# Patient Record
Sex: MALE | Race: WHITE | NOT HISPANIC OR LATINO | Employment: FULL TIME | URBAN - NONMETROPOLITAN AREA
[De-identification: names, ages, dates, MRNs, and addresses within clinical notes are randomized per-mention and may not be internally consistent; named-entity substitution may affect disease eponyms.]

---

## 2023-10-19 RX ORDER — CIPROFLOXACIN 500 MG/1
TABLET ORAL
COMMUNITY
End: 2023-11-17 | Stop reason: ALTCHOICE

## 2023-10-19 RX ORDER — FLURBIPROFEN 100 MG/1
100 TABLET, FILM COATED ORAL 2 TIMES DAILY
COMMUNITY
End: 2023-11-17 | Stop reason: ALTCHOICE

## 2023-10-19 NOTE — PROGRESS NOTES
Patient presents to the office today for ongoing right testicle pain...  Patient has a Hx of Right Testicle Pain intermittently on the left. Patient states the pain is a dull ache 2/10 radiating to right leg...Right testicle is swollen...Recent Scrotal US 12/8/22) was unremarkable.  LUTs are chronic and mild. Denies frequency and urgency. Denies dysuria and hematuria.. Nocturia x1.. Caffeine does worsen LUTs.. No medications for LUTs.

## 2023-10-23 ENCOUNTER — OFFICE VISIT (OUTPATIENT)
Dept: UROLOGY | Facility: CLINIC | Age: 28
End: 2023-10-23
Payer: COMMERCIAL

## 2023-10-23 VITALS
BODY MASS INDEX: 25.62 KG/M2 | HEART RATE: 88 BPM | HEIGHT: 71 IN | DIASTOLIC BLOOD PRESSURE: 91 MMHG | WEIGHT: 183 LBS | SYSTOLIC BLOOD PRESSURE: 152 MMHG

## 2023-10-23 DIAGNOSIS — N50.82 SCROTAL PAIN: Primary | ICD-10-CM

## 2023-10-23 PROCEDURE — 99214 OFFICE O/P EST MOD 30 MIN: CPT | Performed by: STUDENT IN AN ORGANIZED HEALTH CARE EDUCATION/TRAINING PROGRAM

## 2023-10-23 RX ORDER — AMOXICILLIN AND CLAVULANATE POTASSIUM 875; 125 MG/1; MG/1
875 TABLET, FILM COATED ORAL 2 TIMES DAILY
Qty: 8 TABLET | Refills: 0 | Status: SHIPPED | OUTPATIENT
Start: 2023-10-23 | End: 2023-11-17 | Stop reason: ALTCHOICE

## 2023-10-23 NOTE — PROGRESS NOTES
Subjective   Patient ID: Christopher Garcia is a 28 y.o. male who presents for No chief complaint on file.  HPI  Patient presents to the office today for intermittent right testicular tenderness. Patient has a history of Right Testicle Pain intermittently on the left. Patient states the pain is a dull ache 3/10 radiating to right leg. Right testicle is swollen. Patient was having right flank pain but pain has gone away. No n/v, no f/c. Recent Scrotal US (12/8/22) was unremarkable. Patient was prescribed with flurbiprofen and Cipro which did help symptoms for a couple days but pain did return. LUTs are chronic and mild. Denies frequency and urgency. Denies dysuria and hematuria.. Nocturia x1. Caffeine does worsen LUTs. No medications for LUTs. His right testicular pain is currently constant and dull. He denies any recent unprotected sexual encounter with a new partner. 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     Review of Systems  : Positive right testicular pain (dull)  All systems were reviewed. Anything negative was noted in the HPI.    Objective   Physical Exam  Constitutional:       General: He is not in acute distress.     Appearance: He is well-developed.   HENT:      Right Ear: External ear normal.      Mouth/Throat:      Pharynx: Oropharynx is clear.   Eyes:      General: No scleral icterus.     Conjunctiva/sclera: Conjunctivae normal.      Right eye: Right conjunctiva is not injected.      Left eye: Left conjunctiva is not injected.   Pulmonary:      Effort: Pulmonary effort is normal. No respiratory distress.      Breath sounds: Normal breath sounds.   Abdominal:      General: Abdomen is flat. Bowel sounds are normal.      Palpations: Abdomen is soft.      Hernia: There is no hernia in the left inguinal area or right inguinal area.   Genitourinary:     Penis: Normal.       Testes:         Right: Tenderness present.         Left: Mass, tenderness or swelling not present.       Prostate: Not tender.   Musculoskeletal:      Right lower leg: No edema.      Left lower leg: No edema.   Neurological:      Mental Status: He is alert and oriented to person, place, and time.      Motor: Motor function is intact.      Gait: Gait is intact.   Psychiatric:         Attention and Perception: Attention normal. He does not perceive visual hallucinations.         Mood and Affect: Affect normal.         Speech: Speech normal.         Behavior: Behavior normal. Behavior is cooperative.         Judgment: Judgment normal.       No past medical history on file.    No past surgical history on file.    Assessment/Plan   Diagnoses and all orders for this visit:  Scrotal pain      - Right testicular pain: He will take OTC Advil or ibuprofen PRN. He has failed empiric treatment with flurbiprofen 100mg twice daily and ciprofloxacin 500mg p.o. twice daily for 4 days each. We discussed the risk, benefits, adverse events, side effects of the medications, he verbalized understanding and would like to proceed. We also discussed lifestyle modifications in the form of scrotal elevation, ice packing, and frequent ejaculation.    Plan:  Lifestyle modifications  Repeat scrotal US  Start antibiotic course    Follow up: Pending imaging    Scribed for Dr. Horace Kline by Maribel Alvarez medical scribe, on 10/23/23 at 9:00 AM

## 2023-11-17 ENCOUNTER — OFFICE VISIT (OUTPATIENT)
Dept: PRIMARY CARE | Facility: CLINIC | Age: 28
End: 2023-11-17
Payer: COMMERCIAL

## 2023-11-17 VITALS
DIASTOLIC BLOOD PRESSURE: 80 MMHG | WEIGHT: 177 LBS | HEIGHT: 70 IN | HEART RATE: 80 BPM | SYSTOLIC BLOOD PRESSURE: 124 MMHG | BODY MASS INDEX: 25.34 KG/M2

## 2023-11-17 DIAGNOSIS — F17.200 SMOKER: ICD-10-CM

## 2023-11-17 DIAGNOSIS — G43.911 INTRACTABLE MIGRAINE WITH STATUS MIGRAINOSUS, UNSPECIFIED MIGRAINE TYPE: Primary | ICD-10-CM

## 2023-11-17 DIAGNOSIS — H57.11 PAIN OF RIGHT EYE: ICD-10-CM

## 2023-11-17 PROCEDURE — 99214 OFFICE O/P EST MOD 30 MIN: CPT

## 2023-11-17 RX ORDER — IBUPROFEN 400 MG/1
400 TABLET ORAL 2 TIMES DAILY
COMMUNITY

## 2023-11-17 RX ORDER — SUMATRIPTAN 50 MG/1
50 TABLET, FILM COATED ORAL ONCE AS NEEDED
Qty: 27 TABLET | Refills: 3 | Status: SHIPPED | OUTPATIENT
Start: 2023-11-17 | End: 2024-11-16

## 2023-11-17 ASSESSMENT — ENCOUNTER SYMPTOMS
CARDIOVASCULAR NEGATIVE: 1
CONSTITUTIONAL NEGATIVE: 1
GASTROINTESTINAL NEGATIVE: 1
RESPIRATORY NEGATIVE: 1

## 2023-11-17 ASSESSMENT — PATIENT HEALTH QUESTIONNAIRE - PHQ9
2. FEELING DOWN, DEPRESSED OR HOPELESS: NOT AT ALL
SUM OF ALL RESPONSES TO PHQ9 QUESTIONS 1 AND 2: 0
1. LITTLE INTEREST OR PLEASURE IN DOING THINGS: NOT AT ALL

## 2023-11-17 NOTE — PROGRESS NOTES
"Subjective   Patient ID: Christopher Garcia is a 28 y.o. male who presents for new pt , c/o  migraine last week with vision issues  (Vision issues lasted 5 days , pt c/o feeling like something is in his right eye).    HPI   MIGRAINES: Migraine 7 days ago. Symptoms were pain to entire forehead, photophobia, nausea, difficulty to focus visually. Endorses had similar episode about 8 years ago. Tried ibuprofen/Tylenol with only very slight relief. Migraine lasted for 5 days and progressively got better each day. He does not hydrate. Does not take any vitamins.  SMOKER: 15 pack years. Would like to quit, will attempt to do this without medication for now.    Endorses hx of anxiety, self manages this without medication.    Endorses woke up early this morning and feels like something is in his R eyelid.    Fam hx of colon cancer.    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    Review of Systems   Constitutional: Negative.    Respiratory: Negative.     Cardiovascular: Negative.    Gastrointestinal: Negative.        Objective   /80   Pulse 80   Ht 1.778 m (5' 10\")   Wt 80.3 kg (177 lb)   BMI 25.40 kg/m²     Physical Exam  Constitutional:       General: He is not in acute distress.     Appearance: Normal appearance. He is not ill-appearing.   HENT:      Head: Normocephalic and atraumatic.   Eyes:      Extraocular Movements: Extraocular movements intact.      Conjunctiva/sclera: Conjunctivae normal.   Cardiovascular:      Rate and Rhythm: Normal rate.   Pulmonary:      Effort: Pulmonary effort is normal.   Abdominal:      General: There is no distension.   Musculoskeletal:         General: Normal range of motion.      Cervical back: Normal range of motion.   Skin:     General: Skin is warm and dry.   Neurological:      General: No focal deficit present.      Mental Status: He is alert and oriented to person, place, and time.   Psychiatric:         Mood and Affect: Mood normal.         Behavior: Behavior normal.   "       Thought Content: Thought content normal.         Judgment: Judgment normal.         Assessment/Plan        Rx Imitrex prn migraines.  Offered medication for smoking cessation, he will let me know.  Examined eye and no foreign bodies seen, advised keep moist and let me know if not resolving.  Follow up 6 months or sooner prn.

## 2023-11-20 DIAGNOSIS — N50.812 PAIN IN BOTH TESTICLES: Primary | ICD-10-CM

## 2023-11-20 DIAGNOSIS — N50.811 PAIN IN BOTH TESTICLES: Primary | ICD-10-CM

## 2023-11-21 NOTE — PROGRESS NOTES
Patient presents to the office today for a 1 MO F/U w/Scrotal US(11/22/2023)... Results show   ... Patient has a Hx of Right Testicle Pain intermittently on the left. Patient states the pain is a dull ache 2/10 radiating to right leg...Right testicle is swollen...Recent Scrotal US 12/8/22) was unremarkable.  LUTs are chronic and mild. Denies frequency and urgency. Denies dysuria and hematuria.. Nocturia x1.. Caffeine does worsen LUTs.. No medications for LUTs

## 2023-11-22 ENCOUNTER — HOSPITAL ENCOUNTER (OUTPATIENT)
Dept: RADIOLOGY | Facility: HOSPITAL | Age: 28
Discharge: HOME | End: 2023-11-22
Payer: COMMERCIAL

## 2023-11-22 DIAGNOSIS — N50.811 PAIN IN BOTH TESTICLES: ICD-10-CM

## 2023-11-22 DIAGNOSIS — N50.812 PAIN IN BOTH TESTICLES: ICD-10-CM

## 2023-11-22 PROCEDURE — 76870 US EXAM SCROTUM: CPT | Performed by: RADIOLOGY

## 2023-11-22 PROCEDURE — 93975 VASCULAR STUDY: CPT

## 2023-11-27 ENCOUNTER — OFFICE VISIT (OUTPATIENT)
Dept: UROLOGY | Facility: CLINIC | Age: 28
End: 2023-11-27
Payer: COMMERCIAL

## 2023-11-27 VITALS
DIASTOLIC BLOOD PRESSURE: 82 MMHG | HEART RATE: 99 BPM | BODY MASS INDEX: 26.77 KG/M2 | HEIGHT: 70 IN | WEIGHT: 187 LBS | SYSTOLIC BLOOD PRESSURE: 148 MMHG

## 2023-11-27 DIAGNOSIS — N50.82 SCROTAL PAIN: Primary | ICD-10-CM

## 2023-11-27 PROCEDURE — 99213 OFFICE O/P EST LOW 20 MIN: CPT | Performed by: STUDENT IN AN ORGANIZED HEALTH CARE EDUCATION/TRAINING PROGRAM

## 2023-11-27 NOTE — PROGRESS NOTES
Subjective   Patient ID: Christopher Gacria is a 28 y.o. male who presents for Testicle Pain (1 MO F/U Scrotal US).  Testicle Pain  The patient's primary symptoms include testicular pain.   Patient presents to the office today for a 1 MO F/U w/Scrotal US(11/22/2023)... Results show trace bilateral hydroceles. Otherwise unremarkable bilateral  scrotal/testicular ultrasound. Patient has a Hx of Right Testicle Pain intermittently on the left. Patient states the pain is a dull ache 2/10 radiating to right leg and originally started 1 year ago. He did not tolerate Flurbiprofen in the past, He has been taking Advil PRN to manage the pain. Right testicle is swollen...Recent Scrotal US 12/8/22) was unremarkable.  LUTs are chronic and mild. Denies frequency and urgency. Denies dysuria and hematuria.. Nocturia x1.. Caffeine does worsen LUTs.. No medications for LUTs     Review of Systems   Genitourinary:  Positive for testicular pain.   All systems were reviewed. Anything negative was noted in the HPI.    Objective   Physical Exam  Constitutional:       General: He is not in acute distress.     Appearance: He is well-developed.   HENT:      Right Ear: External ear normal.      Mouth/Throat:      Pharynx: Oropharynx is clear.   Eyes:      General: No scleral icterus.     Conjunctiva/sclera: Conjunctivae normal.      Right eye: Right conjunctiva is not injected.      Left eye: Left conjunctiva is not injected.   Pulmonary:      Effort: Pulmonary effort is normal. No respiratory distress.      Breath sounds: Normal breath sounds.   Abdominal:      General: Abdomen is flat. Bowel sounds are normal.      Palpations: Abdomen is soft.   Musculoskeletal:      Right lower leg: No edema.      Left lower leg: No edema.   Neurological:      Mental Status: He is alert and oriented to person, place, and time.      Motor: Motor function is intact.      Gait: Gait is intact.   Psychiatric:         Attention and Perception: Attention normal.  He does not perceive visual hallucinations.         Mood and Affect: Affect normal.         Speech: Speech normal.         Behavior: Behavior normal. Behavior is cooperative.         Judgment: Judgment normal.       No past medical history on file.    No past surgical history on file.    Assessment/Plan   There are no diagnoses linked to this encounter.     - Groin pain: Scrotal US(11/22/2023)... Results show trace bilateral hydroceles. Otherwise unremarkable bilateral  scrotal/testicular ultrasound. We had a very long and extensive discussion with the patient regarding the pathophysiology, differential diagnosis, risk factor, management, natural history, incidence and diagnostic work-up of the condition. We also discussed lifestyle modifications in the form of scrotal elevation, ice packing, and frequent ejaculation.    Plan:  Lifestyle modifications  OTC anti inflammatory PRN  Referral for pelvic floor physical therapy    Follow up: 6 months    Scribed for Dr. Horace Kline by Maribel Alvarez medical scribe, on 11/27/23 at 11:00 AM

## 2023-12-14 ENCOUNTER — OFFICE VISIT (OUTPATIENT)
Dept: PRIMARY CARE | Facility: CLINIC | Age: 28
End: 2023-12-14
Payer: COMMERCIAL

## 2023-12-14 VITALS
HEART RATE: 104 BPM | BODY MASS INDEX: 25.77 KG/M2 | SYSTOLIC BLOOD PRESSURE: 128 MMHG | HEIGHT: 70 IN | DIASTOLIC BLOOD PRESSURE: 72 MMHG | WEIGHT: 180 LBS

## 2023-12-14 DIAGNOSIS — R51.9 NEW ONSET HEADACHE: ICD-10-CM

## 2023-12-14 DIAGNOSIS — G43.911 INTRACTABLE MIGRAINE WITH STATUS MIGRAINOSUS, UNSPECIFIED MIGRAINE TYPE: Primary | ICD-10-CM

## 2023-12-14 PROCEDURE — 99213 OFFICE O/P EST LOW 20 MIN: CPT

## 2023-12-14 ASSESSMENT — ENCOUNTER SYMPTOMS
HEADACHES: 1
CARDIOVASCULAR NEGATIVE: 1
RESPIRATORY NEGATIVE: 1
GASTROINTESTINAL NEGATIVE: 1
CONSTITUTIONAL NEGATIVE: 1

## 2023-12-14 NOTE — PROGRESS NOTES
"Subjective   Patient ID: Christopher Garcia is a 28 y.o. male who presents for pt c/o having issues with his eyes  (He is having light sensitivity, tired feeling eyes and heaviness ).    HPI   Hx of migraines, endorses eye \"tiredness\" and photosensitivity intermittently most days, these symptoms have started since first migraine. He does feel his eye \"tiredness\" and photosensitivity are related to his new migraines. He is hydrating well. Has not tried Imitrex yet, he is taking ibuprofen prn. Has seen eye doctor and unremarkable exam.     Review of Systems   Constitutional: Negative.    Respiratory: Negative.     Cardiovascular: Negative.    Gastrointestinal: Negative.    Neurological:  Positive for headaches.       Objective   /72   Pulse 104   Ht 1.778 m (5' 10\")   Wt 81.6 kg (180 lb)   BMI 25.83 kg/m²     Physical Exam  Constitutional:       General: He is not in acute distress.     Appearance: Normal appearance. He is not ill-appearing.   HENT:      Head: Normocephalic and atraumatic.   Eyes:      Extraocular Movements: Extraocular movements intact.      Conjunctiva/sclera: Conjunctivae normal.   Cardiovascular:      Rate and Rhythm: Normal rate.   Pulmonary:      Effort: Pulmonary effort is normal.   Abdominal:      General: There is no distension.   Musculoskeletal:         General: Normal range of motion.      Cervical back: Normal range of motion.   Skin:     General: Skin is warm and dry.   Neurological:      General: No focal deficit present.      Mental Status: He is alert and oriented to person, place, and time.   Psychiatric:         Mood and Affect: Mood normal.         Behavior: Behavior normal.         Thought Content: Thought content normal.         Judgment: Judgment normal.         Assessment/Plan        Since new symptoms from migraine not resolving will order MRI brain.   We have follow up scheduled for next spring.  "

## 2024-04-02 ENCOUNTER — OFFICE VISIT (OUTPATIENT)
Dept: PRIMARY CARE | Facility: CLINIC | Age: 29
End: 2024-04-02
Payer: MEDICAID

## 2024-04-02 ENCOUNTER — LAB (OUTPATIENT)
Dept: LAB | Facility: LAB | Age: 29
End: 2024-04-02
Payer: MEDICAID

## 2024-04-02 VITALS
SYSTOLIC BLOOD PRESSURE: 110 MMHG | HEART RATE: 77 BPM | DIASTOLIC BLOOD PRESSURE: 78 MMHG | WEIGHT: 188 LBS | BODY MASS INDEX: 26.92 KG/M2 | HEIGHT: 70 IN | OXYGEN SATURATION: 99 %

## 2024-04-02 DIAGNOSIS — F41.9 ANXIETY: ICD-10-CM

## 2024-04-02 DIAGNOSIS — Z13.29 SCREENING FOR THYROID DISORDER: ICD-10-CM

## 2024-04-02 DIAGNOSIS — E66.3 OVERWEIGHT WITH BODY MASS INDEX (BMI) OF 26 TO 26.9 IN ADULT: Primary | ICD-10-CM

## 2024-04-02 DIAGNOSIS — E66.3 OVERWEIGHT WITH BODY MASS INDEX (BMI) OF 26 TO 26.9 IN ADULT: ICD-10-CM

## 2024-04-02 LAB
ALBUMIN SERPL BCP-MCNC: 4.7 G/DL (ref 3.4–5)
ALP SERPL-CCNC: 76 U/L (ref 33–120)
ALT SERPL W P-5'-P-CCNC: 62 U/L (ref 10–52)
ANION GAP SERPL CALC-SCNC: 11 MMOL/L (ref 10–20)
AST SERPL W P-5'-P-CCNC: 37 U/L (ref 9–39)
BILIRUB SERPL-MCNC: 0.6 MG/DL (ref 0–1.2)
BUN SERPL-MCNC: 17 MG/DL (ref 6–23)
CALCIUM SERPL-MCNC: 9.9 MG/DL (ref 8.6–10.3)
CHLORIDE SERPL-SCNC: 104 MMOL/L (ref 98–107)
CHOLEST SERPL-MCNC: 179 MG/DL (ref 0–199)
CHOLESTEROL/HDL RATIO: 6
CO2 SERPL-SCNC: 27 MMOL/L (ref 21–32)
CREAT SERPL-MCNC: 0.93 MG/DL (ref 0.5–1.3)
EGFRCR SERPLBLD CKD-EPI 2021: >90 ML/MIN/1.73M*2
ERYTHROCYTE [DISTWIDTH] IN BLOOD BY AUTOMATED COUNT: 11.9 % (ref 11.5–14.5)
GLUCOSE SERPL-MCNC: 85 MG/DL (ref 74–99)
HCT VFR BLD AUTO: 50.2 % (ref 41–52)
HDLC SERPL-MCNC: 30 MG/DL
HGB BLD-MCNC: 17.3 G/DL (ref 13.5–17.5)
LDLC SERPL CALC-MCNC: 111 MG/DL
MCH RBC QN AUTO: 31.3 PG (ref 26–34)
MCHC RBC AUTO-ENTMCNC: 34.5 G/DL (ref 32–36)
MCV RBC AUTO: 91 FL (ref 80–100)
NON HDL CHOLESTEROL: 149 MG/DL (ref 0–149)
NRBC BLD-RTO: 0 /100 WBCS (ref 0–0)
PLATELET # BLD AUTO: 270 X10*3/UL (ref 150–450)
POTASSIUM SERPL-SCNC: 4.1 MMOL/L (ref 3.5–5.3)
PROT SERPL-MCNC: 7.8 G/DL (ref 6.4–8.2)
RBC # BLD AUTO: 5.52 X10*6/UL (ref 4.5–5.9)
SODIUM SERPL-SCNC: 138 MMOL/L (ref 136–145)
TRIGL SERPL-MCNC: 190 MG/DL (ref 0–149)
TSH SERPL-ACNC: 0.83 MIU/L (ref 0.44–3.98)
VLDL: 38 MG/DL (ref 0–40)
WBC # BLD AUTO: 6.9 X10*3/UL (ref 4.4–11.3)

## 2024-04-02 PROCEDURE — 80053 COMPREHEN METABOLIC PANEL: CPT

## 2024-04-02 PROCEDURE — 3008F BODY MASS INDEX DOCD: CPT

## 2024-04-02 PROCEDURE — 84443 ASSAY THYROID STIM HORMONE: CPT

## 2024-04-02 PROCEDURE — 36415 COLL VENOUS BLD VENIPUNCTURE: CPT

## 2024-04-02 PROCEDURE — 80061 LIPID PANEL: CPT

## 2024-04-02 PROCEDURE — 85027 COMPLETE CBC AUTOMATED: CPT

## 2024-04-02 PROCEDURE — 99213 OFFICE O/P EST LOW 20 MIN: CPT

## 2024-04-02 RX ORDER — BUSPIRONE HYDROCHLORIDE 5 MG/1
5 TABLET ORAL 2 TIMES DAILY PRN
Qty: 60 TABLET | Refills: 1 | Status: SHIPPED | OUTPATIENT
Start: 2024-04-02 | End: 2025-04-02

## 2024-04-02 ASSESSMENT — PATIENT HEALTH QUESTIONNAIRE - PHQ9
SUM OF ALL RESPONSES TO PHQ9 QUESTIONS 1 AND 2: 0
2. FEELING DOWN, DEPRESSED OR HOPELESS: NOT AT ALL
1. LITTLE INTEREST OR PLEASURE IN DOING THINGS: NOT AT ALL

## 2024-04-02 ASSESSMENT — ENCOUNTER SYMPTOMS
GASTROINTESTINAL NEGATIVE: 1
RESPIRATORY NEGATIVE: 1
CONSTITUTIONAL NEGATIVE: 1
CARDIOVASCULAR NEGATIVE: 1

## 2024-04-02 NOTE — PROGRESS NOTES
"Subjective   Patient ID: Christopher Garcia is a 28 y.o. male who presents for pt c/o having some heart rate issues especially when resting.    HPI   Endorses chronic anxiety, difficulty dealing with stressful situations. For the past 6-7 months has experienced feeling his heart beat when he lays in bed, not every night, but most nights. Denies CP/SOB/palpitations, no abnormal rate, blood pressure seems fine.     Review of Systems   Constitutional: Negative.    Respiratory: Negative.     Cardiovascular: Negative.    Gastrointestinal: Negative.        Objective   /78   Pulse 77   Ht 1.778 m (5' 10\")   Wt 85.3 kg (188 lb)   SpO2 99%   BMI 26.98 kg/m²     Physical Exam  Constitutional:       General: He is not in acute distress.     Appearance: Normal appearance. He is not ill-appearing.   HENT:      Head: Normocephalic and atraumatic.   Eyes:      Extraocular Movements: Extraocular movements intact.      Conjunctiva/sclera: Conjunctivae normal.   Cardiovascular:      Rate and Rhythm: Normal rate and regular rhythm.      Heart sounds: Normal heart sounds. No murmur heard.  Pulmonary:      Effort: Pulmonary effort is normal.   Abdominal:      General: There is no distension.   Musculoskeletal:         General: Normal range of motion.      Cervical back: Normal range of motion.   Skin:     General: Skin is warm and dry.   Neurological:      General: No focal deficit present.      Mental Status: He is alert and oriented to person, place, and time.   Psychiatric:         Mood and Affect: Mood normal.         Behavior: Behavior normal.         Thought Content: Thought content normal.         Judgment: Judgment normal.         Assessment/Plan        Fasting labs today.  Rx Buspar 5mg BID prn.  Follow up 2 months or sooner prn.  "

## 2024-05-20 ENCOUNTER — APPOINTMENT (OUTPATIENT)
Dept: PRIMARY CARE | Facility: CLINIC | Age: 29
End: 2024-05-20
Payer: MEDICAID

## 2024-05-20 ENCOUNTER — APPOINTMENT (OUTPATIENT)
Dept: UROLOGY | Facility: CLINIC | Age: 29
End: 2024-05-20
Payer: MEDICAID

## 2024-07-15 ENCOUNTER — APPOINTMENT (OUTPATIENT)
Dept: PRIMARY CARE | Facility: CLINIC | Age: 29
End: 2024-07-15
Payer: MEDICAID

## 2024-07-18 ENCOUNTER — APPOINTMENT (OUTPATIENT)
Dept: PRIMARY CARE | Facility: CLINIC | Age: 29
End: 2024-07-18
Payer: MEDICAID

## 2024-07-18 VITALS
SYSTOLIC BLOOD PRESSURE: 118 MMHG | OXYGEN SATURATION: 98 % | HEIGHT: 70 IN | DIASTOLIC BLOOD PRESSURE: 80 MMHG | BODY MASS INDEX: 27.35 KG/M2 | WEIGHT: 191 LBS | HEART RATE: 80 BPM

## 2024-07-18 DIAGNOSIS — R00.2 PALPITATIONS: Primary | ICD-10-CM

## 2024-07-18 PROCEDURE — 99213 OFFICE O/P EST LOW 20 MIN: CPT

## 2024-07-18 PROCEDURE — 3008F BODY MASS INDEX DOCD: CPT

## 2024-07-18 ASSESSMENT — ENCOUNTER SYMPTOMS
GASTROINTESTINAL NEGATIVE: 1
RESPIRATORY NEGATIVE: 1
CONSTITUTIONAL NEGATIVE: 1
PALPITATIONS: 1

## 2024-07-18 NOTE — PROGRESS NOTES
"Subjective   Patient ID: Christopher Garcia is a 28 y.o. male who presents for pt here for f/u  (Pt still having issues with elevated heart rate at night).    HPI   Endorses chronic anxiety, difficulty dealing with stressful situations. For the past 6-7 months has experienced feeling his heart beat when he lays in bed, not every night, but most nights. Will feel intermittent palpitations at night as well. Trial of Buspar last visit, had SE.    Review of Systems   Constitutional: Negative.    Respiratory: Negative.     Cardiovascular:  Positive for palpitations.   Gastrointestinal: Negative.        Objective   /80   Pulse 80   Ht 1.778 m (5' 10\")   Wt 86.6 kg (191 lb)   SpO2 98%   BMI 27.41 kg/m²     Physical Exam  Constitutional:       General: He is not in acute distress.     Appearance: Normal appearance. He is not ill-appearing.   HENT:      Head: Normocephalic and atraumatic.   Eyes:      Extraocular Movements: Extraocular movements intact.      Conjunctiva/sclera: Conjunctivae normal.   Cardiovascular:      Rate and Rhythm: Normal rate.   Pulmonary:      Effort: Pulmonary effort is normal.   Abdominal:      General: There is no distension.   Musculoskeletal:         General: Normal range of motion.      Cervical back: Normal range of motion.   Skin:     General: Skin is warm and dry.   Neurological:      General: No focal deficit present.      Mental Status: He is alert and oriented to person, place, and time.   Psychiatric:         Mood and Affect: Mood normal.         Behavior: Behavior normal.         Thought Content: Thought content normal.         Judgment: Judgment normal.         Assessment/Plan        7 day Holter ordered, will call with results.  Follow up 6 months or sooner prn.  "

## 2024-07-19 ENCOUNTER — TELEPHONE (OUTPATIENT)
Dept: PRIMARY CARE | Facility: CLINIC | Age: 29
End: 2024-07-19
Payer: MEDICAID

## 2024-07-19 NOTE — TELEPHONE ENCOUNTER
Please place another order for the holter monitor  it was canceled and needs to be put back  in so he can emmett apt  thank you!

## 2024-07-22 ENCOUNTER — HOSPITAL ENCOUNTER (OUTPATIENT)
Dept: CARDIOLOGY | Facility: HOSPITAL | Age: 29
Discharge: HOME | End: 2024-07-22
Payer: MEDICAID

## 2024-07-22 DIAGNOSIS — R00.2 PALPITATIONS: ICD-10-CM

## 2024-07-22 PROCEDURE — 93242 EXT ECG>48HR<7D RECORDING: CPT

## 2024-08-12 ENCOUNTER — TELEPHONE (OUTPATIENT)
Dept: PRIMARY CARE | Facility: CLINIC | Age: 29
End: 2024-08-12
Payer: MEDICAID

## 2024-12-28 ENCOUNTER — APPOINTMENT (OUTPATIENT)
Dept: RADIOLOGY | Facility: HOSPITAL | Age: 29
End: 2024-12-28
Payer: MEDICAID

## 2024-12-28 ENCOUNTER — HOSPITAL ENCOUNTER (EMERGENCY)
Facility: HOSPITAL | Age: 29
Discharge: HOME | End: 2024-12-28
Attending: EMERGENCY MEDICINE
Payer: MEDICAID

## 2024-12-28 VITALS
BODY MASS INDEX: 27.2 KG/M2 | HEART RATE: 65 BPM | RESPIRATION RATE: 16 BRPM | WEIGHT: 190 LBS | DIASTOLIC BLOOD PRESSURE: 67 MMHG | TEMPERATURE: 97.7 F | HEIGHT: 70 IN | OXYGEN SATURATION: 95 % | SYSTOLIC BLOOD PRESSURE: 103 MMHG

## 2024-12-28 DIAGNOSIS — J20.9 ACUTE BRONCHITIS, UNSPECIFIED ORGANISM: Primary | ICD-10-CM

## 2024-12-28 LAB
FLUAV RNA RESP QL NAA+PROBE: NOT DETECTED
FLUBV RNA RESP QL NAA+PROBE: NOT DETECTED
SARS-COV-2 RNA RESP QL NAA+PROBE: NOT DETECTED

## 2024-12-28 PROCEDURE — 99284 EMERGENCY DEPT VISIT MOD MDM: CPT | Mod: 25 | Performed by: EMERGENCY MEDICINE

## 2024-12-28 PROCEDURE — 71046 X-RAY EXAM CHEST 2 VIEWS: CPT | Mod: FOREIGN READ | Performed by: RADIOLOGY

## 2024-12-28 PROCEDURE — 87636 SARSCOV2 & INF A&B AMP PRB: CPT | Performed by: EMERGENCY MEDICINE

## 2024-12-28 PROCEDURE — 71046 X-RAY EXAM CHEST 2 VIEWS: CPT

## 2024-12-28 RX ORDER — PREDNISONE 20 MG/1
40 TABLET ORAL DAILY
Qty: 10 TABLET | Refills: 0 | Status: SHIPPED | OUTPATIENT
Start: 2024-12-28 | End: 2025-01-02

## 2024-12-28 RX ORDER — ALBUTEROL SULFATE 90 UG/1
2 INHALANT RESPIRATORY (INHALATION) EVERY 6 HOURS PRN
Qty: 1 G | Refills: 0 | Status: SHIPPED | OUTPATIENT
Start: 2024-12-28

## 2024-12-28 RX ORDER — BENZONATATE 100 MG/1
100 CAPSULE ORAL 3 TIMES DAILY PRN
Qty: 30 CAPSULE | Refills: 0 | Status: SHIPPED | OUTPATIENT
Start: 2024-12-28

## 2024-12-28 ASSESSMENT — COLUMBIA-SUICIDE SEVERITY RATING SCALE - C-SSRS
1. IN THE PAST MONTH, HAVE YOU WISHED YOU WERE DEAD OR WISHED YOU COULD GO TO SLEEP AND NOT WAKE UP?: NO
6. HAVE YOU EVER DONE ANYTHING, STARTED TO DO ANYTHING, OR PREPARED TO DO ANYTHING TO END YOUR LIFE?: NO
2. HAVE YOU ACTUALLY HAD ANY THOUGHTS OF KILLING YOURSELF?: NO

## 2024-12-28 ASSESSMENT — PAIN DESCRIPTION - PROGRESSION: CLINICAL_PROGRESSION: NOT CHANGED

## 2024-12-28 ASSESSMENT — PAIN DESCRIPTION - LOCATION: LOCATION: CHEST

## 2024-12-28 ASSESSMENT — PAIN DESCRIPTION - PAIN TYPE: TYPE: ACUTE PAIN

## 2024-12-28 ASSESSMENT — PAIN SCALES - GENERAL: PAINLEVEL_OUTOF10: 3

## 2024-12-28 ASSESSMENT — PAIN - FUNCTIONAL ASSESSMENT: PAIN_FUNCTIONAL_ASSESSMENT: 0-10

## 2024-12-28 NOTE — PROGRESS NOTES
Emergency Medicine Transition of Care Note.    I received Christopher Garcia in signout from Dr. Vidales.  Please see the previous ED provider note for all HPI, PE and MDM up to the time of signout at 0700. This is in addition to the primary record.    In brief Christopher Garcia is an 29 y.o. male presenting for   Chief Complaint   Patient presents with    Cough     Pt states he has had a cough for a few weeks. Denies SOB or CP. No fevers and does not cough anything up.      At the time of signout we were awaiting: Viral swabs and chest x-ray    Diagnoses as of 12/28/24 0837   Acute bronchitis, unspecified organism       Medical Decision Making  Chest x-ray is clear.  Viral swabs negative.  Patient treated with oral prednisone, albuterol, Tessalon Perles at home.  Stable at time of discharge.    Final diagnoses:   [J20.9] Acute bronchitis, unspecified organism           Procedure  Procedures    Fareed Phipps, DO

## 2024-12-28 NOTE — ED PROVIDER NOTES
HPI   Chief Complaint   Patient presents with    Cough     Pt states he has had a cough for a few weeks. Denies SOB or CP. No fevers and does not cough anything up.        He 9-year-old male presents with chief complaint of cough.  Patient states he has had a cough for several weeks.  Patient states cough is worsened over the last several hours.  States the cough is basically nonproductive.  Patient denies any chest pain with the cough.  Patient denies any nausea, vomiting or diarrhea.      History provided by:  Patient          Patient History   History reviewed. No pertinent past medical history.  History reviewed. No pertinent surgical history.  Family History   Problem Relation Name Age of Onset    No Known Problems Mother      No Known Problems Father      Colon cancer Paternal Grandfather       Social History     Tobacco Use    Smoking status: Former     Types: Cigarettes    Smokeless tobacco: Not on file   Vaping Use    Vaping status: Every Day   Substance Use Topics    Alcohol use: Yes     Alcohol/week: 5.0 standard drinks of alcohol     Types: 5 Shots of liquor per week    Drug use: Never       Physical Exam   ED Triage Vitals [12/28/24 0612]   Temperature Heart Rate Respirations BP   36.5 °C (97.7 °F) 84 18 115/76      Pulse Ox Temp Source Heart Rate Source Patient Position   96 % Temporal Monitor --      BP Location FiO2 (%)     -- --       Physical Exam  Vitals and nursing note reviewed.   Constitutional:       General: He is not in acute distress.     Appearance: He is well-developed.   HENT:      Head: Normocephalic and atraumatic.   Eyes:      Conjunctiva/sclera: Conjunctivae normal.   Cardiovascular:      Rate and Rhythm: Normal rate and regular rhythm.      Heart sounds: No murmur heard.  Pulmonary:      Effort: Pulmonary effort is normal. No respiratory distress.      Breath sounds: Normal breath sounds.   Abdominal:      Palpations: Abdomen is soft.      Tenderness: There is no abdominal  tenderness.   Musculoskeletal:         General: No swelling.      Cervical back: Neck supple.   Skin:     General: Skin is warm and dry.      Capillary Refill: Capillary refill takes less than 2 seconds.   Neurological:      Mental Status: He is alert.   Psychiatric:         Mood and Affect: Mood normal.         ED Course & MDM   Diagnoses as of 01/03/25 1948   Acute bronchitis, unspecified organism                 No data recorded     [place] Coma Scale Score: 15 (12/28/24 0615 : Manisha Hsu RN)                           Medical Decision Making    discharge  Procedure  Procedures     Amador Vidales DO  01/03/25 1948

## 2025-01-22 ENCOUNTER — OFFICE VISIT (OUTPATIENT)
Dept: URGENT CARE | Facility: CLINIC | Age: 30
End: 2025-01-22
Payer: MEDICAID

## 2025-01-22 VITALS
TEMPERATURE: 97.9 F | DIASTOLIC BLOOD PRESSURE: 79 MMHG | HEIGHT: 70 IN | SYSTOLIC BLOOD PRESSURE: 115 MMHG | BODY MASS INDEX: 27.2 KG/M2 | OXYGEN SATURATION: 97 % | WEIGHT: 190 LBS

## 2025-01-22 DIAGNOSIS — J20.9 ACUTE BRONCHITIS, UNSPECIFIED ORGANISM: Primary | ICD-10-CM

## 2025-01-22 PROCEDURE — 99213 OFFICE O/P EST LOW 20 MIN: CPT | Performed by: NURSE PRACTITIONER

## 2025-01-22 RX ORDER — PREDNISONE 10 MG/1
30 TABLET ORAL DAILY
Qty: 15 TABLET | Refills: 0 | Status: SHIPPED | OUTPATIENT
Start: 2025-01-22 | End: 2025-01-27

## 2025-01-22 RX ORDER — AZITHROMYCIN 250 MG/1
TABLET, FILM COATED ORAL
Qty: 6 TABLET | Refills: 0 | Status: SHIPPED | OUTPATIENT
Start: 2025-01-22 | End: 2025-01-27

## 2025-01-22 RX ORDER — ALBUTEROL SULFATE 90 UG/1
2 INHALANT RESPIRATORY (INHALATION) EVERY 6 HOURS PRN
Qty: 18 G | Refills: 0 | Status: SHIPPED | OUTPATIENT
Start: 2025-01-22 | End: 2026-01-22

## 2025-01-22 NOTE — PROGRESS NOTES
29 y.o. male presents for evaluation of URI.  Symptoms including cough, congestion that have been present for 1.5 months and refractory to OTC meds.  No fever, chills, nausea, vomiting, nausea, diarrhea, sore throat, rashes, headache, fatigue, body aches, abdominal pain, CP, or SOB. States he was seen an evaluated on  in the ED and treated given prednisone albuterol inhaler and tessalon perles for bronchitis. States he did feel improved but symptoms have again worsened the past few days. No exacerbating factors. No known COVID 19/flu exposure.      Vitals:    25 1331   BP: 115/79   Temp: 36.6 °C (97.9 °F)   SpO2: 97%       Allergies   Allergen Reactions    Amoxicillin Diarrhea       Medication Documentation Review Audit       Reviewed by Samantha Kohli MA (Medical Assistant) on 25 at 1331      Medication Order Taking? Sig Documenting Provider Last Dose Status   albuterol 90 mcg/actuation inhaler [id number]  Inhale 2 puffs every 6 hours if needed for wheezing or shortness of breath.   Patient not taking: Reported on 2025    Fareed Murguia, DO  Active   benzonatate (Tessalon) 100 mg capsule [id number]  Take 1 capsule (100 mg) by mouth 3 times a day as needed for cough. Do not crush or chew.   Patient not taking: Reported on 2025    Fareed Murguia DO  Active   ibuprofen 400 mg tablet [id number]  Take 1 tablet (400 mg) by mouth 2 times a day.   Patient not taking: Reported on 2025    Historical Provider, MD  Active   SUMAtriptan (Imitrex) 50 mg tablet [id number]  Take 1 tablet (50 mg) by mouth 1 time if needed for migraine. May repeat after 2 hours. Nicolás Burgos PA-C   [id number]                    No past medical history on file.    No past surgical history on file.    ROS  See HPI    Physical Exam  Vitals and nursing note reviewed.   Constitutional:       General: He is not in acute distress.     Appearance: He is ill-appearing (mildly). He is not toxic-appearing  or diaphoretic.   HENT:      Head: Normocephalic and atraumatic.      Right Ear: Ear canal and external ear normal. Tympanic membrane is not perforated, erythematous or bulging.      Left Ear: Ear canal and external ear normal. Tympanic membrane is erythematous. Tympanic membrane is not perforated or bulging.      Nose: Congestion present.      Mouth/Throat:      Mouth: Mucous membranes are moist.      Pharynx: Oropharynx is clear.   Eyes:      Extraocular Movements: Extraocular movements intact.      Conjunctiva/sclera: Conjunctivae normal.      Pupils: Pupils are equal, round, and reactive to light.   Cardiovascular:      Rate and Rhythm: Normal rate.   Pulmonary:      Effort: Pulmonary effort is normal. No respiratory distress.      Breath sounds: Normal breath sounds. No stridor. No wheezing, rhonchi or rales.      Comments: Dry cough  Chest:      Chest wall: No tenderness.   Lymphadenopathy:      Cervical: Cervical adenopathy present.   Skin:     General: Skin is warm.   Neurological:      General: No focal deficit present.      Mental Status: He is alert and oriented to person, place, and time.   Psychiatric:         Mood and Affect: Mood normal.         Behavior: Behavior normal.           Assessment/Plan/MDM  Christopher was seen today for cough.  Diagnoses and all orders for this visit:  Acute bronchitis, unspecified organism (Primary)  -     azithromycin (Zithromax Z-Idris) 250 mg tablet; Take 2 tablets (500 mg) on  Day 1,  followed by 1 tablet (250 mg) once daily on Days 2 through 5.  -     predniSONE (Deltasone) 10 mg tablet; Take 3 tablets (30 mg) by mouth once daily for 5 days.  -     albuterol 90 mcg/actuation inhaler; Inhale 2 puffs every 6 hours if needed for wheezing.    Encouraged pt to use otc cold remedies PRN, push PO fluids and rest. Patient's clinical presentation is otherwise unremarkable at this time. Patient is discharged with instructions to follow-up with primary care or seek emergency  medical attention for worsening symptoms or any new concerns.    I did personally review Christopher's past medical history, surgical history, social history, as well as family history (when relevant).  In this case, I also oversaw the his drug management by reviewing his medication list, allergy list, as well as the medications that I prescribed during the UC course and/or recommended as an out-patient (including possible OTC medications such as acetaminophen, NSAIDs , etc).    After reviewing the items above, I did look at previous medical documentation, such as recent hospitalizations, office visits, and/or recent consultations with PCP/specialist.                          SDOH:   Another factor that I considered in Christopher's care was his Social Determinants of Health (SDOH). During this UC encounter, he did not have social determinants of health. Those SDOH influencing Christopher's care are: none      Richard Choudhury CNP  [facility]  [phone]

## 2025-01-27 ENCOUNTER — APPOINTMENT (OUTPATIENT)
Dept: PRIMARY CARE | Facility: CLINIC | Age: 30
End: 2025-01-27
Payer: MEDICAID

## 2025-02-06 ENCOUNTER — HOSPITAL ENCOUNTER (EMERGENCY)
Facility: HOSPITAL | Age: 30
Discharge: HOME | End: 2025-02-06
Attending: EMERGENCY MEDICINE
Payer: MEDICAID

## 2025-02-06 ENCOUNTER — APPOINTMENT (OUTPATIENT)
Dept: RADIOLOGY | Facility: HOSPITAL | Age: 30
End: 2025-02-06
Payer: MEDICAID

## 2025-02-06 VITALS
SYSTOLIC BLOOD PRESSURE: 107 MMHG | TEMPERATURE: 97.8 F | BODY MASS INDEX: 27.2 KG/M2 | WEIGHT: 190 LBS | DIASTOLIC BLOOD PRESSURE: 71 MMHG | OXYGEN SATURATION: 97 % | HEIGHT: 70 IN | RESPIRATION RATE: 18 BRPM | HEART RATE: 73 BPM

## 2025-02-06 DIAGNOSIS — J45.20 MILD INTERMITTENT REACTIVE AIRWAY DISEASE WITHOUT COMPLICATION (HHS-HCC): Primary | ICD-10-CM

## 2025-02-06 PROCEDURE — 2500000004 HC RX 250 GENERAL PHARMACY W/ HCPCS (ALT 636 FOR OP/ED): Mod: JZ | Performed by: EMERGENCY MEDICINE

## 2025-02-06 PROCEDURE — 96372 THER/PROPH/DIAG INJ SC/IM: CPT | Performed by: EMERGENCY MEDICINE

## 2025-02-06 PROCEDURE — 71046 X-RAY EXAM CHEST 2 VIEWS: CPT | Performed by: RADIOLOGY

## 2025-02-06 PROCEDURE — 9420000001 HC RT PATIENT EDUCATION 5 MIN

## 2025-02-06 PROCEDURE — 2500000002 HC RX 250 W HCPCS SELF ADMINISTERED DRUGS (ALT 637 FOR MEDICARE OP, ALT 636 FOR OP/ED): Performed by: EMERGENCY MEDICINE

## 2025-02-06 PROCEDURE — 94664 DEMO&/EVAL PT USE INHALER: CPT

## 2025-02-06 PROCEDURE — 99284 EMERGENCY DEPT VISIT MOD MDM: CPT | Mod: 25 | Performed by: EMERGENCY MEDICINE

## 2025-02-06 PROCEDURE — 94640 AIRWAY INHALATION TREATMENT: CPT

## 2025-02-06 PROCEDURE — 71046 X-RAY EXAM CHEST 2 VIEWS: CPT

## 2025-02-06 RX ORDER — IPRATROPIUM BROMIDE AND ALBUTEROL SULFATE 2.5; .5 MG/3ML; MG/3ML
3 SOLUTION RESPIRATORY (INHALATION) ONCE
Status: COMPLETED | OUTPATIENT
Start: 2025-02-06 | End: 2025-02-06

## 2025-02-06 RX ADMIN — IPRATROPIUM BROMIDE AND ALBUTEROL SULFATE 3 ML: .5; 3 SOLUTION RESPIRATORY (INHALATION) at 21:56

## 2025-02-06 RX ADMIN — METHYLPREDNISOLONE SODIUM SUCCINATE 125 MG: 125 INJECTION, POWDER, FOR SOLUTION INTRAMUSCULAR; INTRAVENOUS at 21:44

## 2025-02-06 ASSESSMENT — PAIN SCALES - GENERAL
PAINLEVEL_OUTOF10: 3

## 2025-02-06 ASSESSMENT — COLUMBIA-SUICIDE SEVERITY RATING SCALE - C-SSRS
1. IN THE PAST MONTH, HAVE YOU WISHED YOU WERE DEAD OR WISHED YOU COULD GO TO SLEEP AND NOT WAKE UP?: NO
2. HAVE YOU ACTUALLY HAD ANY THOUGHTS OF KILLING YOURSELF?: NO
6. HAVE YOU EVER DONE ANYTHING, STARTED TO DO ANYTHING, OR PREPARED TO DO ANYTHING TO END YOUR LIFE?: NO

## 2025-02-06 ASSESSMENT — PAIN - FUNCTIONAL ASSESSMENT
PAIN_FUNCTIONAL_ASSESSMENT: 0-10
PAIN_FUNCTIONAL_ASSESSMENT: 0-10

## 2025-02-07 NOTE — ED PROVIDER NOTES
HPI   Chief Complaint   Patient presents with    Cough     Pt states he was seen here a few weeks ago for a cough and congestion, some days he will get a little better but then feels worse.. He states was seen here a month ago and since has seen [facility] a few times and they've given him a couple rounds of steroids and antibiotics, states he's having trouble getting in to see his PCP.        29-year-old male presents with cough, congestion and wheezing.  Patient states she has been symptomatic intermittently over the last 4 to 6 weeks.  Patient was treated with steroids on several occasions with seemed to help for a period of time and then the symptoms would return.  Patient also states at one point he was on a Z-Idris which helped for several days.  Patient does have audible wheezing here in the emergency department.  Patient will be given a DuoNeb aerosol.  Patient's vital signs are stable and he is not hypoxic with a pulse oximeter reading of 97%.  Patient states cough is dry and nonproductive.  Patient states he had a COVID test which was negative.  I did go over the chest x-ray with the patient.  I indicated to him that he had reactive airway disease.  Patient will be given a shot of Solu-Medrol and an aerosol prior to discharge.  Patient has been referred to his family medical doctor for a pulmonology referral if symptoms persist.      History provided by:  Patient          Patient History   No past medical history on file.  No past surgical history on file.  Family History   Problem Relation Name Age of Onset    No Known Problems Mother      No Known Problems Father      Colon cancer Paternal Grandfather       Social History     Tobacco Use    Smoking status: Former     Types: Cigarettes    Smokeless tobacco: Not on file   Vaping Use    Vaping status: Every Day   Substance Use Topics    Alcohol use: Yes     Alcohol/week: 5.0 standard drinks of alcohol     Types: 5 Shots of liquor per week    Drug use: Never        Physical Exam   ED Triage Vitals [02/06/25 2049]   Temperature Heart Rate Respirations BP   36.6 °C (97.8 °F) 100 20 122/77      Pulse Ox Temp src Heart Rate Source Patient Position   94 % -- -- --      BP Location FiO2 (%)     -- --       Physical Exam  Vitals and nursing note reviewed.   Constitutional:       General: He is not in acute distress.     Appearance: He is well-developed.   HENT:      Head: Normocephalic and atraumatic.   Eyes:      Conjunctiva/sclera: Conjunctivae normal.   Cardiovascular:      Rate and Rhythm: Normal rate and regular rhythm.      Heart sounds: No murmur heard.  Pulmonary:      Effort: Pulmonary effort is normal. No respiratory distress.      Breath sounds: Wheezing present.   Abdominal:      Palpations: Abdomen is soft.      Tenderness: There is no abdominal tenderness.   Musculoskeletal:         General: No swelling.      Cervical back: Neck supple.   Skin:     General: Skin is warm and dry.      Capillary Refill: Capillary refill takes less than 2 seconds.   Neurological:      Mental Status: He is alert.   Psychiatric:         Mood and Affect: Mood normal.         XR chest 2 views   Final Result   1. Bronchial thickening. Query reactive airway disease. No localizing   infiltrate.                  MACRO:   None        Signed by: Abrahan Kam 2/6/2025 9:25 PM   Dictation workstation:   [id number]         ED Course & MDM   Diagnoses as of 02/06/25 2143   Mild intermittent reactive airway disease without complication ([facility])                 No data recorded                                 Medical Decision Making  1 take medication as prescribed 2 continue to use home inhaler 3 follow-up with family medical doctor for pulmonary referral, if symptoms worsen return to ED.        Procedure  Procedures     Amador Vidales DO  02/06/25 2143

## 2025-02-18 ENCOUNTER — APPOINTMENT (OUTPATIENT)
Dept: PRIMARY CARE | Facility: CLINIC | Age: 30
End: 2025-02-18
Payer: MEDICAID

## 2025-02-18 VITALS
SYSTOLIC BLOOD PRESSURE: 120 MMHG | BODY MASS INDEX: 27.25 KG/M2 | WEIGHT: 189.9 LBS | DIASTOLIC BLOOD PRESSURE: 78 MMHG | OXYGEN SATURATION: 98 % | HEART RATE: 101 BPM

## 2025-02-18 DIAGNOSIS — J40 BRONCHITIS: Primary | ICD-10-CM

## 2025-02-18 PROCEDURE — 99214 OFFICE O/P EST MOD 30 MIN: CPT | Performed by: NURSE PRACTITIONER

## 2025-02-18 ASSESSMENT — ENCOUNTER SYMPTOMS
CHILLS: 0
FEVER: 0
SHORTNESS OF BREATH: 0
NAUSEA: 0
COUGH: 1
VOMITING: 0
WHEEZING: 0
DIARRHEA: 0
PALPITATIONS: 0
SINUS PRESSURE: 1

## 2025-02-18 NOTE — PROGRESS NOTES
Subjective   Patient ID: Christopher Garcia is a 29 y.o. male who presents for Chronic cough (Dry cough since November. Has been on steroids to help, but keeps coming back. On week 2 of prednisone now. Has been to ER in December and a couple weeks ago).  28 yo male presents for chronic cough.  Has been to ER and urgent care a total of 3 x since Dec.  History of seasonal allergies.  Reports he takes Zyrtec which has been helpful for watery itchy eyes and throat.  Reports he is on 10mg daily.  States he is feeling much better and continues on the Prednisone.  States previously, as soon as he would finish Prednisone then the cough would return.  Reports he is an ex-smoker.  Stopped smoking about 2 wks.  Smoked under one pk per day x 15 years.    Also reports he has seen provider about occasional rectal bleeding.  States it comes and goes and is very minimal, red blood.  States he was informed he has a fissure in his rectum.  Admits he does not eat healthy and at times his stool can be hard.  States he does not want any testing at this time for his cough (PFT) or occult stools.  He does have family history of prostate cancer in his grand-father.          Review of Systems   Constitutional:  Negative for chills and fever.   HENT:  Positive for congestion and sinus pressure.    Respiratory:  Positive for cough. Negative for shortness of breath and wheezing.    Cardiovascular:  Negative for chest pain and palpitations.   Gastrointestinal:  Negative for diarrhea, nausea and vomiting.       Objective   Physical Exam  Vitals and nursing note reviewed.   Constitutional:       General: He is not in acute distress.  HENT:      Head: Normocephalic.      Right Ear: Tympanic membrane normal.      Left Ear: Tympanic membrane normal.      Nose: Congestion present.      Mouth/Throat:      Mouth: Mucous membranes are moist.      Pharynx: No oropharyngeal exudate or posterior oropharyngeal erythema.   Cardiovascular:      Rate and  Rhythm: Normal rate and regular rhythm.      Pulses: Normal pulses.      Heart sounds: Normal heart sounds. No murmur heard.     No friction rub. No gallop.   Pulmonary:      Effort: Pulmonary effort is normal.      Breath sounds: Wheezing present. No rhonchi or rales.      Comments: Very slight wheeze on expiration, forced in lower right base.  No cough noted during interview or exam.    Abdominal:      General: Bowel sounds are normal.   Musculoskeletal:      Right lower leg: No edema.      Left lower leg: No edema.   Skin:     General: Skin is warm and dry.   Neurological:      Mental Status: He is alert.   Psychiatric:         Mood and Affect: Mood normal.       /78 (BP Location: Left arm, Patient Position: Sitting)   Pulse 101   Wt 86.1 kg (189 lb 14.4 oz)   SpO2 98%   BMI 27.25 kg/m²       Current Outpatient Medications:     albuterol 90 mcg/actuation inhaler, Inhale 2 puffs every 6 hours if needed for wheezing or shortness of breath., Disp: 1 g, Rfl: 0    benzonatate (Tessalon) 100 mg capsule, Take 1 capsule (100 mg) by mouth 3 times a day as needed for cough. Do not crush or chew., Disp: 30 capsule, Rfl: 0    albuterol 90 mcg/actuation inhaler, Inhale 2 puffs every 6 hours if needed for wheezing., Disp: 18 g, Rfl: 0    ibuprofen 400 mg tablet, Take 1 tablet (400 mg) by mouth 2 times a day. (Patient not taking: Reported on 2/18/2025), Disp: , Rfl:     SUMAtriptan (Imitrex) 50 mg tablet, Take 1 tablet (50 mg) by mouth 1 time if needed for migraine. May repeat after 2 hours., Disp: 27 tablet, Rfl: 3   History reviewed. No pertinent past medical history.   History reviewed. No pertinent surgical history.     Family History   Problem Relation Name Age of Onset    No Known Problems Mother      No Known Problems Father      Colon cancer Paternal Grandfather          Assessment/Plan   Problem List Items Addressed This Visit       Bronchitis - Primary   Recommend pft.  He recently just quit smoking and  previous smoker x 15 years.  Recommend maintenance inhaler.  Pt will call if no improvement after his current Prednisone use.    Recommend occult stool at least.  Pt will think about that.

## 2025-03-03 ENCOUNTER — HOSPITAL ENCOUNTER (EMERGENCY)
Facility: HOSPITAL | Age: 30
Discharge: HOME | End: 2025-03-03
Attending: EMERGENCY MEDICINE
Payer: MEDICAID

## 2025-03-03 ENCOUNTER — APPOINTMENT (OUTPATIENT)
Dept: RADIOLOGY | Facility: HOSPITAL | Age: 30
End: 2025-03-03
Payer: MEDICAID

## 2025-03-03 VITALS
HEART RATE: 86 BPM | BODY MASS INDEX: 25.77 KG/M2 | SYSTOLIC BLOOD PRESSURE: 130 MMHG | RESPIRATION RATE: 16 BRPM | OXYGEN SATURATION: 96 % | TEMPERATURE: 97.2 F | WEIGHT: 180 LBS | DIASTOLIC BLOOD PRESSURE: 83 MMHG | HEIGHT: 70 IN

## 2025-03-03 DIAGNOSIS — K62.5 BRIGHT RED RECTAL BLEEDING: Primary | ICD-10-CM

## 2025-03-03 LAB
ALBUMIN SERPL BCP-MCNC: 4.6 G/DL (ref 3.4–5)
ALP SERPL-CCNC: 74 U/L (ref 33–120)
ALT SERPL W P-5'-P-CCNC: 35 U/L (ref 10–52)
ANION GAP SERPL CALC-SCNC: 11 MMOL/L (ref 10–20)
APPEARANCE UR: ABNORMAL
AST SERPL W P-5'-P-CCNC: 24 U/L (ref 9–39)
BASOPHILS # BLD AUTO: 0.03 X10*3/UL (ref 0–0.1)
BASOPHILS NFR BLD AUTO: 0.5 %
BILIRUB SERPL-MCNC: 0.6 MG/DL (ref 0–1.2)
BILIRUB UR STRIP.AUTO-MCNC: NEGATIVE MG/DL
BUN SERPL-MCNC: 14 MG/DL (ref 6–23)
CALCIUM SERPL-MCNC: 9.6 MG/DL (ref 8.6–10.3)
CHLORIDE SERPL-SCNC: 106 MMOL/L (ref 98–107)
CO2 SERPL-SCNC: 25 MMOL/L (ref 21–32)
COLOR UR: ABNORMAL
CREAT SERPL-MCNC: 1 MG/DL (ref 0.5–1.3)
EGFRCR SERPLBLD CKD-EPI 2021: >90 ML/MIN/1.73M*2
EOSINOPHIL # BLD AUTO: 0.54 X10*3/UL (ref 0–0.7)
EOSINOPHIL NFR BLD AUTO: 8.3 %
ERYTHROCYTE [DISTWIDTH] IN BLOOD BY AUTOMATED COUNT: 12 % (ref 11.5–14.5)
GLUCOSE SERPL-MCNC: 93 MG/DL (ref 74–99)
GLUCOSE UR STRIP.AUTO-MCNC: NORMAL MG/DL
HCT VFR BLD AUTO: 49.5 % (ref 41–52)
HGB BLD-MCNC: 17.1 G/DL (ref 13.5–17.5)
IMM GRANULOCYTES # BLD AUTO: 0.01 X10*3/UL (ref 0–0.7)
IMM GRANULOCYTES NFR BLD AUTO: 0.2 % (ref 0–0.9)
KETONES UR STRIP.AUTO-MCNC: NEGATIVE MG/DL
LACTATE SERPL-SCNC: 0.8 MMOL/L (ref 0.4–2)
LEUKOCYTE ESTERASE UR QL STRIP.AUTO: NEGATIVE
LIPASE SERPL-CCNC: 31 U/L (ref 9–82)
LYMPHOCYTES # BLD AUTO: 2.01 X10*3/UL (ref 1.2–4.8)
LYMPHOCYTES NFR BLD AUTO: 30.9 %
MCH RBC QN AUTO: 30.9 PG (ref 26–34)
MCHC RBC AUTO-ENTMCNC: 34.5 G/DL (ref 32–36)
MCV RBC AUTO: 89 FL (ref 80–100)
MONOCYTES # BLD AUTO: 0.79 X10*3/UL (ref 0.1–1)
MONOCYTES NFR BLD AUTO: 12.2 %
NEUTROPHILS # BLD AUTO: 3.12 X10*3/UL (ref 1.2–7.7)
NEUTROPHILS NFR BLD AUTO: 47.9 %
NITRITE UR QL STRIP.AUTO: NEGATIVE
NRBC BLD-RTO: 0 /100 WBCS (ref 0–0)
PH UR STRIP.AUTO: 7 [PH]
PLATELET # BLD AUTO: 218 X10*3/UL (ref 150–450)
POTASSIUM SERPL-SCNC: 3.8 MMOL/L (ref 3.5–5.3)
PROT SERPL-MCNC: 6.9 G/DL (ref 6.4–8.2)
PROT UR STRIP.AUTO-MCNC: NEGATIVE MG/DL
RBC # BLD AUTO: 5.54 X10*6/UL (ref 4.5–5.9)
RBC # UR STRIP.AUTO: NEGATIVE MG/DL
SODIUM SERPL-SCNC: 138 MMOL/L (ref 136–145)
SP GR UR STRIP.AUTO: 1.02
UROBILINOGEN UR STRIP.AUTO-MCNC: NORMAL MG/DL
WBC # BLD AUTO: 6.5 X10*3/UL (ref 4.4–11.3)

## 2025-03-03 PROCEDURE — 99285 EMERGENCY DEPT VISIT HI MDM: CPT | Mod: 25 | Performed by: EMERGENCY MEDICINE

## 2025-03-03 PROCEDURE — 2500000004 HC RX 250 GENERAL PHARMACY W/ HCPCS (ALT 636 FOR OP/ED): Performed by: EMERGENCY MEDICINE

## 2025-03-03 PROCEDURE — 83690 ASSAY OF LIPASE: CPT | Performed by: EMERGENCY MEDICINE

## 2025-03-03 PROCEDURE — 96360 HYDRATION IV INFUSION INIT: CPT | Mod: 59

## 2025-03-03 PROCEDURE — 85025 COMPLETE CBC W/AUTO DIFF WBC: CPT | Performed by: EMERGENCY MEDICINE

## 2025-03-03 PROCEDURE — 83605 ASSAY OF LACTIC ACID: CPT | Performed by: EMERGENCY MEDICINE

## 2025-03-03 PROCEDURE — 74177 CT ABD & PELVIS W/CONTRAST: CPT

## 2025-03-03 PROCEDURE — 2550000001 HC RX 255 CONTRASTS: Performed by: EMERGENCY MEDICINE

## 2025-03-03 PROCEDURE — 74177 CT ABD & PELVIS W/CONTRAST: CPT | Performed by: INTERNAL MEDICINE

## 2025-03-03 PROCEDURE — 96361 HYDRATE IV INFUSION ADD-ON: CPT

## 2025-03-03 PROCEDURE — 81003 URINALYSIS AUTO W/O SCOPE: CPT | Performed by: EMERGENCY MEDICINE

## 2025-03-03 PROCEDURE — 36415 COLL VENOUS BLD VENIPUNCTURE: CPT | Performed by: EMERGENCY MEDICINE

## 2025-03-03 PROCEDURE — 84075 ASSAY ALKALINE PHOSPHATASE: CPT | Performed by: EMERGENCY MEDICINE

## 2025-03-03 RX ORDER — SODIUM CHLORIDE 9 MG/ML
150 INJECTION, SOLUTION INTRAVENOUS CONTINUOUS
Status: DISCONTINUED | OUTPATIENT
Start: 2025-03-03 | End: 2025-03-03 | Stop reason: HOSPADM

## 2025-03-03 RX ORDER — ONDANSETRON HYDROCHLORIDE 2 MG/ML
4 INJECTION, SOLUTION INTRAVENOUS ONCE
Status: DISCONTINUED | OUTPATIENT
Start: 2025-03-03 | End: 2025-03-03 | Stop reason: HOSPADM

## 2025-03-03 RX ORDER — MORPHINE SULFATE 4 MG/ML
4 INJECTION, SOLUTION INTRAMUSCULAR; INTRAVENOUS ONCE
Status: DISCONTINUED | OUTPATIENT
Start: 2025-03-03 | End: 2025-03-03 | Stop reason: HOSPADM

## 2025-03-03 RX ADMIN — IOHEXOL 70 ML: 350 INJECTION, SOLUTION INTRAVENOUS at 15:50

## 2025-03-03 RX ADMIN — SODIUM CHLORIDE 500 ML: 0.9 INJECTION, SOLUTION INTRAVENOUS at 15:29

## 2025-03-03 ASSESSMENT — VISUAL ACUITY: OU: 1

## 2025-03-03 ASSESSMENT — PAIN SCALES - GENERAL
PAINLEVEL_OUTOF10: 0 - NO PAIN
PAINLEVEL_OUTOF10: 2

## 2025-03-03 ASSESSMENT — PAIN - FUNCTIONAL ASSESSMENT: PAIN_FUNCTIONAL_ASSESSMENT: 0-10

## 2025-03-03 NOTE — DISCHARGE INSTRUCTIONS
I would recommend a colonoscopy to further evaluate the etiology of your bloody stools.  Today's workup was fairly unremarkable.

## 2025-03-03 NOTE — ED PROVIDER NOTES
Rectal bleeding.  This 20-year-old white male presents to the ED with complaint of rectal bleeding that began 7 days ago he states that he also has been experiencing some dull abdominal pain symptoms since yesterday states the pain is across the upper quadrants and today is now in the left lower quadrant of the abdomen.  He denies any associated nausea or vomiting.  He states that he does not have a true decrease in his appetite but has been eating less because every time he eats or drinks he has immediate onset of loose stools with blood and that he describes the blood is bright red.      History provided by:  Patient   used: No         Physical Exam  Vitals and nursing note reviewed.   Constitutional:       General: He is awake.      Appearance: Normal appearance. He is normal weight.   HENT:      Head: Normocephalic and atraumatic.      Right Ear: Hearing and external ear normal.      Left Ear: Hearing and external ear normal.      Nose: Nose normal. No congestion or rhinorrhea.      Mouth/Throat:      Lips: Pink.      Mouth: Mucous membranes are moist.      Pharynx: Oropharynx is clear. No oropharyngeal exudate or posterior oropharyngeal erythema.   Eyes:      General: Lids are normal. Vision grossly intact.         Right eye: No discharge.         Left eye: No discharge.      Extraocular Movements: Extraocular movements intact.      Conjunctiva/sclera: Conjunctivae normal.      Pupils: Pupils are equal, round, and reactive to light.   Cardiovascular:      Rate and Rhythm: Normal rate and regular rhythm.      Pulses: Normal pulses.      Heart sounds: Normal heart sounds. No murmur heard.     No friction rub. No gallop.   Pulmonary:      Effort: Pulmonary effort is normal. No respiratory distress.      Breath sounds: Normal breath sounds. No stridor. No wheezing, rhonchi or rales.   Chest:      Chest wall: No tenderness.   Abdominal:      General: Abdomen is flat. Bowel sounds are normal.  There is no distension.      Palpations: Abdomen is soft. There is no mass.      Tenderness: There is no abdominal tenderness. There is no guarding or rebound.      Hernia: No hernia is present.      Comments: Patient has a benign abdominal exam.   Musculoskeletal:         General: No swelling, tenderness, deformity or signs of injury. Normal range of motion.      Cervical back: Full passive range of motion without pain, normal range of motion and neck supple.      Right lower leg: Normal. No edema.      Left lower leg: Normal. No edema.   Skin:     General: Skin is warm and dry.      Capillary Refill: Capillary refill takes less than 2 seconds.      Coloration: Skin is not jaundiced or pale.      Findings: No bruising, erythema, lesion or rash.   Neurological:      General: No focal deficit present.      Mental Status: He is alert and oriented to person, place, and time.      GCS: GCS eye subscore is 4. GCS verbal subscore is 5. GCS motor subscore is 6.      Cranial Nerves: Cranial nerves 2-12 are intact. No cranial nerve deficit.      Sensory: Sensation is intact. No sensory deficit.      Motor: Motor function is intact. No weakness.      Coordination: Coordination is intact. Coordination normal.      Gait: Gait is intact.      Deep Tendon Reflexes: Reflexes normal.   Psychiatric:         Attention and Perception: Attention and perception normal.         Mood and Affect: Mood normal.         Speech: Speech normal.         Behavior: Behavior normal. Behavior is cooperative.         Thought Content: Thought content normal.         Cognition and Memory: Cognition and memory normal.         Judgment: Judgment normal.          Labs Reviewed   URINALYSIS WITH REFLEX CULTURE AND MICROSCOPIC - Abnormal       Result Value    Color, Urine Light-Yellow      Appearance, Urine Turbid (*)     Specific Gravity, Urine 1.016      pH, Urine 7.0      Protein, Urine NEGATIVE      Glucose, Urine Normal      Blood, Urine NEGATIVE       Ketones, Urine NEGATIVE      Bilirubin, Urine NEGATIVE      Urobilinogen, Urine Normal      Nitrite, Urine NEGATIVE      Leukocyte Esterase, Urine NEGATIVE     LACTATE - Normal    Lactate 0.8      Narrative:     Venipuncture immediately after or during the administration of Metamizole may lead to falsely low results. Testing should be performed immediately prior to Metamizole dosing.   LIPASE - Normal    Lipase 31      Narrative:     Venipuncture immediately after or during the administration of Metamizole may lead to falsely low results. Testing should be performed immediately prior to Metamizole dosing.   COMPREHENSIVE METABOLIC PANEL - Normal    Glucose 93      Sodium 138      Potassium 3.8      Chloride 106      Bicarbonate 25      Anion Gap 11      Urea Nitrogen 14      Creatinine 1.00      eGFR >90      Calcium 9.6      Albumin 4.6      Alkaline Phosphatase 74      Total Protein 6.9      AST 24      Bilirubin, Total 0.6      ALT 35     CBC WITH AUTO DIFFERENTIAL    WBC 6.5      nRBC 0.0      RBC 5.54      Hemoglobin 17.1      Hematocrit 49.5      MCV 89      MCH 30.9      MCHC 34.5      RDW 12.0      Platelets 218      Neutrophils % 47.9      Immature Granulocytes %, Automated 0.2      Lymphocytes % 30.9      Monocytes % 12.2      Eosinophils % 8.3      Basophils % 0.5      Neutrophils Absolute 3.12      Immature Granulocytes Absolute, Automated 0.01      Lymphocytes Absolute 2.01      Monocytes Absolute 0.79      Eosinophils Absolute 0.54      Basophils Absolute 0.03     URINALYSIS WITH REFLEX CULTURE AND MICROSCOPIC    Narrative:     The following orders were created for panel order Urinalysis with Reflex Culture and Microscopic.  Procedure                               Abnormality         Status                     ---------                               -----------         ------                     Urinalysis with Reflex C...[[id number]]  Abnormal            Final result               Extra Urine Calderón  Tube[[id number]]                            In process                   Please view results for these tests on the individual orders.   EXTRA URINE GRAY TUBE        CT abdomen pelvis w IV contrast   Final Result   1.  No acute abnormality to explain patient's rectal bleeding and   abdominal pain. If there is clinical concern for acute GI bleed,   further evaluation with CT angio abdomen pelvis triple phase GI   protocol or nuclear medicine tagged RBC scan is recommended.   2. Unchanged prominence of the bilateral renal pelvises, with new   mild dilatation of the left ureter.   3. Submucosal fatty infiltration of the colon may relate to patient   BMI or prior colitis.   4. Stable borderline splenomegaly.             MACRO:   None        Signed by: Jacinta Garrido 3/3/2025 4:12 PM   Dictation workstation:   [id number]           Procedures     Medical Decision Making  Patient was seen and evaluated due to complaint of rectal bleeding.  The patient also has associated abdominal pain symptoms.  Patient was worked up for these symptoms with blood work and a CT scan of the abdomen pelvis.  The CT scan of abdomen pelvis revealed no acute abnormality to explain the patient's symptoms and the lab work was unremarkable.  I had a further discussion with the patient concerning his lab and CT scan results and recommendation for follow-up for colonoscopy as an outpatient.  The patient did complain of previous fissure but states that he had focal pain in the rectal area but had any abdominal pain complaints.  Patient was discharged home and referred to Dr. Tarango,  Dr. Cai and Dr. Wilson.         Diagnoses as of 03/03/25 1741   Bright red rectal bleeding                    Akil Valdez, DO  03/07/25 0737

## 2025-03-04 LAB — HOLD SPECIMEN: NORMAL

## 2025-03-06 ENCOUNTER — OFFICE VISIT (OUTPATIENT)
Dept: SURGERY | Facility: CLINIC | Age: 30
End: 2025-03-06
Payer: MEDICAID

## 2025-03-06 ENCOUNTER — APPOINTMENT (OUTPATIENT)
Dept: PRIMARY CARE | Facility: CLINIC | Age: 30
End: 2025-03-06
Payer: MEDICAID

## 2025-03-06 VITALS
HEIGHT: 70 IN | DIASTOLIC BLOOD PRESSURE: 76 MMHG | HEART RATE: 101 BPM | SYSTOLIC BLOOD PRESSURE: 122 MMHG | BODY MASS INDEX: 27.06 KG/M2 | WEIGHT: 189 LBS

## 2025-03-06 DIAGNOSIS — K62.5 BRIGHT RED RECTAL BLEEDING: ICD-10-CM

## 2025-03-06 PROCEDURE — 3008F BODY MASS INDEX DOCD: CPT | Performed by: SURGERY

## 2025-03-06 PROCEDURE — 99244 OFF/OP CNSLTJ NEW/EST MOD 40: CPT | Performed by: SURGERY

## 2025-03-06 NOTE — LETTER
2025     Akil Valdez DO  [address]  [facility] [address]    Patient: Christopher Garcia   YOB: 1995   Date of Visit: 3/6/2025       Dear Dr. Akil Valdez DO:    Thank you for referring Christopher Garcia to me for evaluation. Below are my notes for this consultation.  If you have questions, please do not hesitate to call me. I look forward to following your patient along with you.       Sincerely,     Jena Tarango MD      CC: Nicolás Burgos PA-C  ______________________________________________________________________________________    General Surgery Consultation    Patient: Christopher Garcia  : 1995  MRN: [id number]  Date of Consultation: 25    Primary Care Provider: Nicolás Burgos PA-C  Referring Provider: Akil Valdez DO    Chief Complaint: BRBPR    History of Present Illness: Christopher Garcia is a 29 y.o. old male seen at the request of Dr. Valdez for evaluation of BRBPR.  He was evaluated for this in the emergency department on 3/3/25.  His hemoglobin was 17.  He had a CT scan that showed no acute abnormality.  He reports that the bleeding began about a week and a half ago following a very firm bowel movement.  He also had perianal pain associated with this.  He continues to have pain following bowel movements.  He normally has bowel movements once to twice a day.  However, since this bleeding began he has decreased his oral intake as he has been afraid of passing additional bowel movements.  Therefore, he is now having bowel movements every other day.  He is not on any stool softeners, fiber supplements, or laxatives.  He states that he typically does not have to strain with bowel movements.  He is seeing blood with just about every bowel movement.  He has a history of an anal fissure many years ago that healed on its own.    Medical History:  BRBPR  History of an anal fissure  Anxiety    Surgical History:  No previous abdominal surgery or  endoscopy.    Home Medications:  Prior to Admission medications    Medication Sig Start Date End Date Taking? Authorizing Provider   albuterol 90 mcg/actuation inhaler Inhale 2 puffs every 6 hours if needed for wheezing or shortness of breath. 24   Fareed Murguia, DO   albuterol 90 mcg/actuation inhaler Inhale 2 puffs every 6 hours if needed for wheezing. 25  LIT Griffin   benzonatate (Tessalon) 100 mg capsule Take 1 capsule (100 mg) by mouth 3 times a day as needed for cough. Do not crush or chew.  Patient not taking: Reported on 3/3/2025 12/28/24   Fareed Murguia,    SUMAtriptan (Imitrex) 50 mg tablet Take 1 tablet (50 mg) by mouth 1 time if needed for migraine. May repeat after 2 hours.  Patient not taking: Reported on 3/3/2025 11/17/23 11/16/24  Nicolás Burgos PA-C   ibuprofen 400 mg tablet Take 1 tablet (400 mg) by mouth 2 times a day.  Patient not taking: Reported on 2025  3/3/25  Historical Provider, MD     Allergies:  Allergies   Allergen Reactions   • Amoxicillin Diarrhea     Family History:   Paternal grandfather with history of colon cancer, diagnosed around age 60 although this was a very advanced cancer at time of diagnosis and he  within a year of being diagnosed.  No family history of inflammatory bowel disease.    Social History:  Former smoker.  He drinks alcohol once every couple of weeks.  No drug use.  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  He recently started working out again but states that this has not been anything strenuous and does not involve heavy lifting.    ROS:  Constitutional:  no fever, sweats, and chills  Cardiovascular: No chest pain, + palpitations  Respiratory: No cough or shortness of breath  Gastrointestinal: + BRBPR and perianal pain that began acutely after a firm bowel movement  Genitourinary: no dysuria  Musculoskeletal: + Neck pain  Integumentary: no rashes  Neurological: no  "confusion  Theatric: + Anxiety, difficulty sleeping  Endocrine: no heat or cold intolerance  Heme/Lymph: no easy bruising or bleeding    Objective:  /76   Pulse 101   Ht 1.79 m (5' 10.47\")   Wt 85.7 kg (189 lb)   BMI 26.76 kg/m²     Physical Exam:  Constitutional: No acute distress, conversant, pleasant  Neurologic: alert and oriented  Psych: appropriate affect  Ears, Nose, Mouth and Throat: mucus membranes moist  Pulmonary: No labored breathing  Cardiovascular: Regular rate and rhythm  Abdomen: Non-distended, BMI 26  Rectal: On external perianal examination, he has a large anterior midline fissure with some bright red blood.  He has increased rectal tone.  He is exquisitely tender when this is palpated.  Given finding of a fissure, digital rectal exam and anoscopy were deferred.  He had no other external perianal abnormalities.  Musculoskeletal: Moves all extremities, no edema  Skin: no jaundice    Labs:  Labs from 3/3/25 reviewed: Hgb 17.1, Plts 218, LFT's wnl    Imaging:  CT a/p from 3/3/25 reviewed: No acute abnormality to explain rectal bleeding and abdominal pain.  Submucosal fatty infiltration of the colon may relate to prior colitis.    Assessment and Plan: Christopher Garcia is a 29 y.o. old male with an anal fissure.  I have prescribed nifedipine compounded with lidocaine to be applied to the anus 3 times daily.  We discussed the importance of keeping bowel movements soft and regular.  I have recommended twice daily Colace and drinking plenty of water throughout the day.  I will see him back in 2 months to assess for healing.  Once this is completely healed, at some point I recommended diagnostic colonoscopy just to assure no additional source of blood per rectum.      Jena Tarango MD  3/6/2025    "

## 2025-03-06 NOTE — PROGRESS NOTES
General Surgery Consultation    Patient: Christopher Garcia  : 1995  MRN: [id number]  Date of Consultation: 25    Primary Care Provider: Nicolás Burgos PA-C  Referring Provider: Akil Valdez DO    Chief Complaint: BRBPR    History of Present Illness: Christopher Garcia is a 29 y.o. old male seen at the request of Dr. Valdez for evaluation of BRBPR.  He was evaluated for this in the emergency department on 3/3/25.  His hemoglobin was 17.  He had a CT scan that showed no acute abnormality.  He reports that the bleeding began about a week and a half ago following a very firm bowel movement.  He also had perianal pain associated with this.  He continues to have pain following bowel movements.  He normally has bowel movements once to twice a day.  However, since this bleeding began he has decreased his oral intake as he has been afraid of passing additional bowel movements.  Therefore, he is now having bowel movements every other day.  He is not on any stool softeners, fiber supplements, or laxatives.  He states that he typically does not have to strain with bowel movements.  He is seeing blood with just about every bowel movement.  He has a history of an anal fissure many years ago that healed on its own.    Medical History:  BRBPR  History of an anal fissure  Anxiety    Surgical History:  No previous abdominal surgery or endoscopy.    Home Medications:  Prior to Admission medications    Medication Sig Start Date End Date Taking? Authorizing Provider   albuterol 90 mcg/actuation inhaler Inhale 2 puffs every 6 hours if needed for wheezing or shortness of breath. 24   Fareed Murguia DO   albuterol 90 mcg/actuation inhaler Inhale 2 puffs every 6 hours if needed for wheezing. 25  BEBA Griffin-CNP   benzonatate (Tessalon) 100 mg capsule Take 1 capsule (100 mg) by mouth 3 times a day as needed for cough. Do not crush or chew.  Patient not taking: Reported on 3/3/2025  "24   Fareed Murguia DO   SUMAtriptan (Imitrex) 50 mg tablet Take 1 tablet (50 mg) by mouth 1 time if needed for migraine. May repeat after 2 hours.  Patient not taking: Reported on 3/3/2025 11/17/23 11/16/24  Nicolás Burgos PA-C   ibuprofen 400 mg tablet Take 1 tablet (400 mg) by mouth 2 times a day.  Patient not taking: Reported on 2025  3/3/25  Historical Provider, MD     Allergies:  Allergies   Allergen Reactions    Amoxicillin Diarrhea     Family History:   Paternal grandfather with history of colon cancer, diagnosed around age 60 although this was a very advanced cancer at time of diagnosis and he  within a year of being diagnosed.  No family history of inflammatory bowel disease.    Social History:  Former smoker.  He drinks alcohol once every couple of weeks.  No drug use.  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  He recently started working out again but states that this has not been anything strenuous and does not involve heavy lifting.    ROS:  Constitutional:  no fever, sweats, and chills  Cardiovascular: No chest pain, + palpitations  Respiratory: No cough or shortness of breath  Gastrointestinal: + BRBPR and perianal pain that began acutely after a firm bowel movement  Genitourinary: no dysuria  Musculoskeletal: + Neck pain  Integumentary: no rashes  Neurological: no confusion  Theatric: + Anxiety, difficulty sleeping  Endocrine: no heat or cold intolerance  Heme/Lymph: no easy bruising or bleeding    Objective:  /76   Pulse 101   Ht 1.79 m (5' 10.47\")   Wt 85.7 kg (189 lb)   BMI 26.76 kg/m²     Physical Exam:  Constitutional: No acute distress, conversant, pleasant  Neurologic: alert and oriented  Psych: appropriate affect  Ears, Nose, Mouth and Throat: mucus membranes moist  Pulmonary: No labored breathing  Cardiovascular: Regular rate and rhythm  Abdomen: Non-distended, BMI 26  Rectal: On external perianal examination, he has a " large anterior midline fissure with some bright red blood.  He has increased rectal tone.  He is exquisitely tender when this is palpated.  Given finding of a fissure, digital rectal exam and anoscopy were deferred.  He had no other external perianal abnormalities.  Musculoskeletal: Moves all extremities, no edema  Skin: no jaundice    Labs:  Labs from 3/3/25 reviewed: Hgb 17.1, Plts 218, LFT's wnl    Imaging:  CT a/p from 3/3/25 reviewed: No acute abnormality to explain rectal bleeding and abdominal pain.  Submucosal fatty infiltration of the colon may relate to prior colitis.    Assessment and Plan: Christopher Garcia is a 29 y.o. old male with an anal fissure.  I have prescribed nifedipine compounded with lidocaine to be applied to the anus 3 times daily.  We discussed the importance of keeping bowel movements soft and regular.  I have recommended twice daily Colace and drinking plenty of water throughout the day.  I will see him back in 2 months to assess for healing.  Once this is completely healed, at some point I recommended diagnostic colonoscopy just to assure no additional source of blood per rectum.      Jena Tarango MD  3/6/2025

## 2025-03-10 ENCOUNTER — OFFICE VISIT (OUTPATIENT)
Dept: SURGERY | Facility: CLINIC | Age: 30
End: 2025-03-10
Payer: MEDICAID

## 2025-03-10 VITALS
DIASTOLIC BLOOD PRESSURE: 70 MMHG | BODY MASS INDEX: 27.06 KG/M2 | HEIGHT: 70 IN | WEIGHT: 189 LBS | SYSTOLIC BLOOD PRESSURE: 122 MMHG | TEMPERATURE: 104 F

## 2025-03-10 DIAGNOSIS — K60.2 ANAL FISSURE: Primary | ICD-10-CM

## 2025-03-10 PROCEDURE — 99212 OFFICE O/P EST SF 10 MIN: CPT | Performed by: SURGERY

## 2025-03-10 PROCEDURE — 3008F BODY MASS INDEX DOCD: CPT | Performed by: SURGERY

## 2025-03-10 NOTE — PROGRESS NOTES
General Surgery Follow-up Visit    Patient: Chritsopher Garcia  : 1995  MRN: [id number]  Date of Visit: 03/10/25    Chief Complaint: Perianal pain and swelling    History of Present Illness: Christopher Garcia is a 29 y.o. old male who I just saw last week and diagnosed with an anal fissure.  He called the office today complaining of pain and swelling at the anus.  This began yesterday.  He drank alcohol to excess over the weekend and was vomiting.  He thinks that while he was vomiting he may have been straining.  The pain and swelling began shortly afterward.  When I examined him last week, the pain was only with bowel movements.  Now, the pain is more constant.     Medical History:  BRBPR  Anal fissure  Anxiety     Surgical History:  No previous abdominal surgery or endoscopy.    Home Medications:  Prior to Admission medications    Medication Sig Start Date End Date Taking? Authorizing Provider   albuterol 90 mcg/actuation inhaler Inhale 2 puffs every 6 hours if needed for wheezing or shortness of breath. 24   Fareed Murguia, DO   albuterol 90 mcg/actuation inhaler Inhale 2 puffs every 6 hours if needed for wheezing. 25  Richard Choudhury, BEBA-CNP   benzonatate (Tessalon) 100 mg capsule Take 1 capsule (100 mg) by mouth 3 times a day as needed for cough. Do not crush or chew. 24   Fareed Murguia, DO   SUMAtriptan (Imitrex) 50 mg tablet Take 1 tablet (50 mg) by mouth 1 time if needed for migraine. May repeat after 2 hours.  Patient not taking: Reported on 3/3/2025 11/17/23 11/16/24  Nicolás Burgos PA-C     Allergies:  Amoxicillin.    Family History:   Paternal grandfather with history of colon cancer, diagnosed around age 60 although this was a very advanced cancer at time of diagnosis and he  within a year of being diagnosed.  No family history of inflammatory bowel disease.     Social History:  Former smoker.  He drinks alcohol once every couple of weeks.  No drug use.   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  He recently started working out again but states that this has not been anything strenuous and does not involve heavy lifting.     ROS:  Constitutional:  no fever, sweats, and chills  Cardiovascular: No chest pain, + palpitations  Respiratory: No cough or shortness of breath  Gastrointestinal: + BRBPR and perianal pain that began acutely after a firm bowel movement, pain was initially just with bowel movements now it is more constant and he has a little bit of swelling.  Genitourinary: no dysuria  Musculoskeletal: + Neck pain  Integumentary: no rashes  Neurological: no confusion  Theatric: + Anxiety, difficulty sleeping  Endocrine: no heat or cold intolerance  Heme/Lymph: no easy bruising or bleeding    Physical Exam:  Constitutional: No acute distress, conversant, pleasant  Neurologic: alert and oriented  Psych: appropriate affect  Ears, Nose, Mouth and Throat: mucus membranes moist  Pulmonary: No labored breathing  Cardiovascular: Regular rate and rhythm  Abdomen: Non-distended, BMI 26  Rectal: Large anterior midline fissure, focally tender at this site, increased rectal tone.  He has a little bit more inflammation of the tissue immediately to the left of the anterior midline fissure, but no other external abnormality.  Specifically, no thrombosed external hemorrhoid or evidence of infection.  Musculoskeletal: Moves all extremities, no edema  Skin: no jaundice    Labs:  Labs from 3/3/25 reviewed: Hgb 17.1, Plts 218, LFT's wnl     Imaging:  CT a/p from 3/3/25 reviewed: No acute abnormality to explain rectal bleeding and abdominal pain.  Submucosal fatty infiltration of the colon may relate to prior colitis.    Assessment and Plan: Christopher Garcia is a 29 y.o. old male with an anal fissure.  The tissue immediately to the left of the fissure is slightly more inflamed on today's exam compared to last week, but no other external  abnormality on physical exam.  Specifically, there is no thrombosed external hemorrhoid or evidence of infection. For the fissure, he has been prescribed nifedipine compounded with lidocaine to be applied to the anus 3 times daily.  We discussed the importance of keeping bowel movements soft and regular, and avoiding straining. I have recommended twice daily Colace and drinking plenty of water throughout the day.  I will see him back in 2 months to assess for healing.  Once healed, he will need a diagnostic colonoscopy to assure no additional source of blood per rectum.       Jena Tarango MD  3/10/2025

## 2025-05-05 ENCOUNTER — APPOINTMENT (OUTPATIENT)
Dept: SURGERY | Facility: CLINIC | Age: 30
End: 2025-05-05
Payer: MEDICAID

## 2025-05-05 ENCOUNTER — TELEPHONE (OUTPATIENT)
Dept: PRIMARY CARE | Facility: CLINIC | Age: 30
End: 2025-05-05

## 2025-05-05 ENCOUNTER — OFFICE VISIT (OUTPATIENT)
Dept: URGENT CARE | Facility: CLINIC | Age: 30
End: 2025-05-05
Payer: MEDICAID

## 2025-05-05 VITALS
HEIGHT: 71 IN | TEMPERATURE: 98.7 F | WEIGHT: 189 LBS | BODY MASS INDEX: 26.46 KG/M2 | SYSTOLIC BLOOD PRESSURE: 117 MMHG | HEART RATE: 92 BPM | DIASTOLIC BLOOD PRESSURE: 79 MMHG | OXYGEN SATURATION: 97 %

## 2025-05-05 DIAGNOSIS — J20.9 ACUTE BRONCHITIS, UNSPECIFIED ORGANISM: ICD-10-CM

## 2025-05-05 DIAGNOSIS — J45.20 MILD INTERMITTENT REACTIVE AIRWAY DISEASE WITHOUT COMPLICATION (HHS-HCC): Primary | ICD-10-CM

## 2025-05-05 PROCEDURE — 99213 OFFICE O/P EST LOW 20 MIN: CPT | Performed by: NURSE PRACTITIONER

## 2025-05-05 RX ORDER — ALBUTEROL SULFATE 90 UG/1
2 INHALANT RESPIRATORY (INHALATION) EVERY 6 HOURS PRN
Qty: 18 G | Refills: 0 | Status: SHIPPED | OUTPATIENT
Start: 2025-05-05 | End: 2026-05-05

## 2025-05-05 RX ORDER — MONTELUKAST SODIUM 10 MG/1
10 TABLET ORAL DAILY
Qty: 30 TABLET | Refills: 2 | Status: SHIPPED | OUTPATIENT
Start: 2025-05-05 | End: 2026-05-05

## 2025-05-05 NOTE — PROGRESS NOTES
29 y.o. male presents for evaluation of cough in the morning. This cough has been present since he was ill with bronchitis a few months ago. States he has been using inhaler that has been helping and once he clears his chest from coughing he feels better for the rest of the day. Denies any SOB, chest pains, orthopnea, URI symptoms or any other associated symptoms or complaints. No otc meds for symptoms. He was seen in the ER as well as urgent care and PCP since onset of symptoms. He has not follow up with PCP regarding PFTs. He has since resumed smoking cigarettes and using a vape as he reported to PCP that he has quit smoking. No history of asthma. Did have xray done that showed reactive airway disease.      Vitals:    25 1808   BP: 117/79   Pulse: 92   Temp: 37.1 °C (98.7 °F)   SpO2: 97%       RX Allergies[1]    Medication Documentation Review Audit       Reviewed by Samantha Kohli MA (Medical Assistant) on 25 at 1807      Medication Order Taking? Sig Documenting Provider Last Dose Status   albuterol 90 mcg/actuation inhaler [id number] Yes Inhale 2 puffs every 6 hours if needed for wheezing or shortness of breath. Fareed Murguia, DO  Active   albuterol 90 mcg/actuation inhaler [id number] No Inhale 2 puffs every 6 hours if needed for wheezing. Richard Choudhury, APRN-CNP 3/3/2025 Noon Active   benzonatate (Tessalon) 100 mg capsule [id number] No Take 1 capsule (100 mg) by mouth 3 times a day as needed for cough. Do not crush or chew.   Patient not taking: Reported on 2025    Fareed Murugia, DO Not Taking Active   SUMAtriptan (Imitrex) 50 mg tablet [id number] No Take 1 tablet (50 mg) by mouth 1 time if needed for migraine. May repeat after 2 hours.   Patient not taking: Reported on 3/3/2025    Nicolás Burgos PA-C Not Taking  24 2942                    Medical History[2]    Surgical History[3]    ROS  See HPI    Physical Exam  Vitals and nursing note reviewed.   Constitutional:        Appearance: Normal appearance.   HENT:      Head: Normocephalic and atraumatic.      Right Ear: Tympanic membrane, ear canal and external ear normal.      Left Ear: Tympanic membrane, ear canal and external ear normal.      Nose: Nose normal.      Mouth/Throat:      Mouth: Mucous membranes are moist.      Pharynx: Oropharynx is clear.   Eyes:      Extraocular Movements: Extraocular movements intact.      Conjunctiva/sclera: Conjunctivae normal.      Pupils: Pupils are equal, round, and reactive to light.   Cardiovascular:      Rate and Rhythm: Normal rate and regular rhythm.      Pulses: Normal pulses.      Heart sounds: Normal heart sounds.   Pulmonary:      Effort: Pulmonary effort is normal. No respiratory distress.      Breath sounds: Normal breath sounds. No stridor. No wheezing, rhonchi or rales.   Skin:     General: Skin is warm.      Capillary Refill: Capillary refill takes less than 2 seconds.   Neurological:      General: No focal deficit present.      Mental Status: He is alert and oriented to person, place, and time.   Psychiatric:         Mood and Affect: Mood normal.         Behavior: Behavior normal.           Assessment/Plan/MDM  Christopher was seen today for med refill.  Diagnoses and all orders for this visit:  Mild intermittent reactive airway disease without complication ([facility]) (Primary)  -     albuterol 90 mcg/actuation inhaler; Inhale 2 puffs every 6 hours if needed for wheezing.  -     montelukast (Singulair) 10 mg tablet; Take 1 tablet (10 mg) by mouth once daily.    Patient to follow-up with PCP for further workup of reactive airway disease or pulmonary referral per his request.  Encouraged him to continue OTC antihistamines, will start Singulair today also.  Patient's clinical presentation is otherwise unremarkable at this time. Patient is discharged with instructions to follow-up with primary care or seek emergency medical attention for worsening symptoms or any new concerns.    I did  personally review Christopher's past medical history, surgical history, social history, as well as family history (when relevant).  In this case, I also oversaw the his drug management by reviewing his medication list, allergy list, as well as the medications that I prescribed during the UC course and/or recommended as an out-patient (including possible OTC medications such as acetaminophen, NSAIDs , etc).    After reviewing the items above, I did look at previous medical documentation, such as recent hospitalizations, office visits, and/or recent consultations with PCP/specialist.                          SDOH:   Another factor that I considered in Christopher's care was his Social Determinants of Health (SDOH). During this UC encounter, he did not have social determinants of health. Those SDOH influencing Christopher's care are: none      Richard Choudhury CNP  [facility]  [phone]         [1]   Allergies  Allergen Reactions    Amoxicillin Diarrhea   [2] No past medical history on file.  [3] No past surgical history on file.

## 2025-05-05 NOTE — TELEPHONE ENCOUNTER
Pc to patient to let him know that his message to cancel appt was left at our office instead of Dr. Tarango.  I gave him Dr. Kwok phone number and he will call there to cancel his appt.

## 2025-05-05 NOTE — TELEPHONE ENCOUNTER
Requesting a refill for Albuterol inhaler  Was Rx by another provider, but feels like he can still use in especially in the morning.    [facility]

## 2025-05-05 NOTE — TELEPHONE ENCOUNTER
Message from patient via answering service on 5/4/25 to cancel appt today.  I looked and his appt today is with Dr. Tarango.  Will call patient and let him know that.

## 2025-05-06 RX ORDER — ALBUTEROL SULFATE 90 UG/1
2 INHALANT RESPIRATORY (INHALATION) EVERY 6 HOURS PRN
Qty: 18 G | Refills: 0 | Status: SHIPPED | OUTPATIENT
Start: 2025-05-06 | End: 2026-05-06

## 2025-05-12 ENCOUNTER — APPOINTMENT (OUTPATIENT)
Dept: SURGERY | Facility: CLINIC | Age: 30
End: 2025-05-12
Payer: MEDICAID

## 2025-05-27 ENCOUNTER — TELEPHONE (OUTPATIENT)
Dept: URGENT CARE | Facility: CLINIC | Age: 30
End: 2025-05-27
Payer: MEDICAID

## 2025-05-27 DIAGNOSIS — J20.9 ACUTE BRONCHITIS, UNSPECIFIED ORGANISM: ICD-10-CM

## 2025-05-27 RX ORDER — ALBUTEROL SULFATE 90 UG/1
2 INHALANT RESPIRATORY (INHALATION) EVERY 6 HOURS PRN
Qty: 18 G | Refills: 1 | Status: SHIPPED | OUTPATIENT
Start: 2025-05-27 | End: 2026-05-27

## 2025-05-27 NOTE — TELEPHONE ENCOUNTER
Patient called in stating inhaler broke yesterday evening while trying to use, requesting new inhaler prescription be sent in.  Order placed per request.

## 2025-06-02 ENCOUNTER — APPOINTMENT (OUTPATIENT)
Dept: SURGERY | Facility: CLINIC | Age: 30
End: 2025-06-02
Payer: MEDICAID

## 2025-06-23 ENCOUNTER — APPOINTMENT (OUTPATIENT)
Dept: SURGERY | Facility: CLINIC | Age: 30
End: 2025-06-23
Payer: MEDICAID

## 2025-06-23 VITALS
HEIGHT: 71 IN | HEART RATE: 73 BPM | BODY MASS INDEX: 26.15 KG/M2 | DIASTOLIC BLOOD PRESSURE: 76 MMHG | SYSTOLIC BLOOD PRESSURE: 110 MMHG | WEIGHT: 186.8 LBS

## 2025-06-23 DIAGNOSIS — K60.2 ANAL FISSURE: Primary | ICD-10-CM

## 2025-06-23 PROCEDURE — 99213 OFFICE O/P EST LOW 20 MIN: CPT | Performed by: SURGERY

## 2025-06-23 PROCEDURE — 3008F BODY MASS INDEX DOCD: CPT | Performed by: SURGERY

## 2025-06-23 NOTE — PROGRESS NOTES
General Surgery Follow-up Visit    Patient: Christopher Garcia  : 1995  MRN: [id number]  Date of Visit: 25    Chief Complaint: Anal fissure    History of Present Illness: Christopher Garcia is a 29 y.o. old male seen in follow-up for an anal fissure.  I first saw him for this 3 months ago.  At that time, he had acute onset of severe perianal pain and a small amount of bright red blood per rectum after passing a very firm bowel movement.  He had a large anterior midline fissure on exam.  We treated him with nifedipine and lidocaine ointment.  He took Colace twice daily to keep his bowel movements soft and regular.  He had complete resolution of his pain and bleeding.  He stopped taking the ointment and Colace.  He is having daily bowel movements.  After going a few weeks without any pain or bleeding, he did see a small amount of recurrent bleeding with a bowel movement this morning.  He denies any perianal pain.    Medical History:  Anal fissure  Anxiety     Surgical History:  No previous abdominal surgery or endoscopy.    Home Medications:  Prior to Admission medications    Medication Sig Start Date End Date Taking? Authorizing Provider   albuterol 90 mcg/actuation inhaler Inhale 2 puffs every 6 hours if needed for wheezing. 25  BEBA Rea-CNP   benzonatate (Tessalon) 100 mg capsule Take 1 capsule (100 mg) by mouth 3 times a day as needed for cough. Do not crush or chew.  Patient not taking: Reported on 24   Fareed Murguia,    montelukast (Singulair) 10 mg tablet Take 1 tablet (10 mg) by mouth once daily. 25  ELIO GriffinCNP   SUMAtriptan (Imitrex) 50 mg tablet Take 1 tablet (50 mg) by mouth 1 time if needed for migraine. May repeat after 2 hours.  Patient not taking: Reported on 3/3/2025 11/17/23 11/16/24  Nicolás Burgos PA-C     Allergies:  Amoxicillin.     Family History:   Paternal grandfather with history of colon cancer, diagnosed  "around age 60 although this was a very advanced cancer at time of diagnosis and he  within a year of being diagnosed.  No family history of inflammatory bowel disease.     Social History:  Former smoker.  He drinks alcohol once every couple of weeks.  No drug use.  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  He recently started working out again but states that this has not been anything strenuous and does not involve heavy lifting.     ROS:  Constitutional:  no fever, sweats, and chills  Cardiovascular: No chest pain, + palpitations  Respiratory: No cough or shortness of breath  Gastrointestinal: + Resolution of previous perianal pain, the blood per rectum had stopped over the past few weeks but he saw a small amount of bleeding that recurred this morning  Genitourinary: no dysuria  Musculoskeletal: + Neck pain  Integumentary: no rashes  Neurological: no confusion  Theatric: + Anxiety, difficulty sleeping  Endocrine: no heat or cold intolerance  Heme/Lymph: no easy bruising or bleeding    Objective:  /76   Pulse 73   Ht 1.791 m (5' 10.5\")   Wt 84.7 kg (186 lb 12.8 oz)   BMI 26.42 kg/m²     Physical Exam:  Constitutional: No acute distress, conversant, pleasant  Neurologic: alert and oriented  Psych: appropriate affect  Ears, Nose, Mouth and Throat: mucus membranes moist  Pulmonary: No labored breathing  Cardiovascular: Regular rate and rhythm  Abdomen: Non-distended, BMI 26  Rectal: He has a moderately deep  cleft, he has a small amount of bright red blood at the anus, he has a very small superficial anal fissure that looks like it is healing  Musculoskeletal: Moves all extremities, no edema  Skin: no jaundice    Labs/Imaging:   No new labs or imaging available for review.    Assessment and Plan: Christopher Garcia is a 29 y.o. old male with an anal fissure, which is healing.  His perianal pain has resolved.  I emphasized the importance of continuing to keep " bowel movements soft and regular so that this does not recur.  He can use stool softeners as needed to achieve this, or could consider a daily fiber supplementation.  We discussed drinking adequate water throughout the day.  Since he has seen blood per rectum, I recommend a colonoscopy to assure that there is not a any alternative etiology aside from this fissure.  He would like to hold off on scheduling that at this time.  His girlfriend would be his ride for the procedure and she just started a new job and they would have to coordinate on a day that both of them have off of work.  We discussed that I typically schedule about 2 weeks out, and would be happy to fit him in whenever would be convenient for him.  He will call our office when he is ready to schedule.  We would sign a consent the day of the procedure.  I will otherwise see him back as needed.    Jena Tarango MD  6/23/2025

## 2025-08-30 ENCOUNTER — TELEPHONE (OUTPATIENT)
Dept: PRIMARY CARE | Facility: CLINIC | Age: 30
End: 2025-08-30
Payer: MEDICAID

## 2025-08-30 DIAGNOSIS — J20.9 ACUTE BRONCHITIS, UNSPECIFIED ORGANISM: ICD-10-CM

## 2025-09-02 RX ORDER — ALBUTEROL SULFATE 90 UG/1
2 INHALANT RESPIRATORY (INHALATION) EVERY 6 HOURS PRN
Qty: 18 G | Refills: 1 | Status: SHIPPED | OUTPATIENT
Start: 2025-09-02 | End: 2025-09-04 | Stop reason: SDUPTHER

## 2025-09-02 RX ORDER — ALBUTEROL SULFATE 90 UG/1
2 INHALANT RESPIRATORY (INHALATION) EVERY 6 HOURS PRN
OUTPATIENT
Start: 2025-09-02 | End: 2026-09-02

## 2025-09-04 DIAGNOSIS — J20.9 ACUTE BRONCHITIS, UNSPECIFIED ORGANISM: ICD-10-CM

## 2025-09-04 RX ORDER — ALBUTEROL SULFATE 90 UG/1
2 INHALANT RESPIRATORY (INHALATION) EVERY 6 HOURS PRN
Qty: 18 G | Refills: 3 | Status: SHIPPED | OUTPATIENT
Start: 2025-09-04 | End: 2026-09-04